# Patient Record
Sex: MALE | Race: WHITE | NOT HISPANIC OR LATINO | Employment: OTHER | URBAN - METROPOLITAN AREA
[De-identification: names, ages, dates, MRNs, and addresses within clinical notes are randomized per-mention and may not be internally consistent; named-entity substitution may affect disease eponyms.]

---

## 2017-03-07 ENCOUNTER — COMMUNICATION - HEALTHEAST (OUTPATIENT)
Dept: CARDIOLOGY | Facility: CLINIC | Age: 62
End: 2017-03-07

## 2017-03-07 DIAGNOSIS — I10 HYPERTENSION: ICD-10-CM

## 2017-04-24 ENCOUNTER — COMMUNICATION - HEALTHEAST (OUTPATIENT)
Dept: CARDIOLOGY | Facility: CLINIC | Age: 62
End: 2017-04-24

## 2017-04-24 DIAGNOSIS — I25.10 CORONARY ATHEROSCLEROSIS: ICD-10-CM

## 2017-05-22 ENCOUNTER — COMMUNICATION - HEALTHEAST (OUTPATIENT)
Dept: FAMILY MEDICINE | Facility: CLINIC | Age: 62
End: 2017-05-22

## 2017-05-22 DIAGNOSIS — E11.9 DIABETES (H): ICD-10-CM

## 2017-05-24 ENCOUNTER — COMMUNICATION - HEALTHEAST (OUTPATIENT)
Dept: FAMILY MEDICINE | Facility: CLINIC | Age: 62
End: 2017-05-24

## 2017-05-24 DIAGNOSIS — E11.9 DIABETES (H): ICD-10-CM

## 2017-05-26 ENCOUNTER — COMMUNICATION - HEALTHEAST (OUTPATIENT)
Dept: FAMILY MEDICINE | Facility: CLINIC | Age: 62
End: 2017-05-26

## 2017-05-26 ENCOUNTER — COMMUNICATION - HEALTHEAST (OUTPATIENT)
Dept: CARDIOLOGY | Facility: CLINIC | Age: 62
End: 2017-05-26

## 2017-05-26 DIAGNOSIS — E11.9 DIABETES (H): ICD-10-CM

## 2017-05-30 ENCOUNTER — COMMUNICATION - HEALTHEAST (OUTPATIENT)
Dept: FAMILY MEDICINE | Facility: CLINIC | Age: 62
End: 2017-05-30

## 2017-05-30 ENCOUNTER — COMMUNICATION - HEALTHEAST (OUTPATIENT)
Dept: SCHEDULING | Facility: CLINIC | Age: 62
End: 2017-05-30

## 2017-05-30 DIAGNOSIS — E11.9 DIABETES (H): ICD-10-CM

## 2017-06-07 ENCOUNTER — AMBULATORY - HEALTHEAST (OUTPATIENT)
Dept: LAB | Facility: CLINIC | Age: 62
End: 2017-06-07

## 2017-06-07 DIAGNOSIS — I25.10 CORONARY ARTERY DISEASE: ICD-10-CM

## 2017-06-07 DIAGNOSIS — E11.9 TYPE II OR UNSPECIFIED TYPE DIABETES MELLITUS WITHOUT MENTION OF COMPLICATION, NOT STATED AS UNCONTROLLED: ICD-10-CM

## 2017-06-07 DIAGNOSIS — I10 ESSENTIAL HYPERTENSION, BENIGN: ICD-10-CM

## 2017-06-07 DIAGNOSIS — E78.00 PURE HYPERCHOLESTEROLEMIA: ICD-10-CM

## 2017-06-07 LAB
CHOLEST SERPL-MCNC: 88 MG/DL
FASTING STATUS PATIENT QL REPORTED: YES
HBA1C MFR BLD: 7.2 % (ref 3.5–6)
HDLC SERPL-MCNC: 32 MG/DL
LDLC SERPL CALC-MCNC: 42 MG/DL
TRIGL SERPL-MCNC: 68 MG/DL

## 2017-06-12 ENCOUNTER — RECORDS - HEALTHEAST (OUTPATIENT)
Dept: ADMINISTRATIVE | Facility: OTHER | Age: 62
End: 2017-06-12

## 2017-06-17 ENCOUNTER — RECORDS - HEALTHEAST (OUTPATIENT)
Dept: ADMINISTRATIVE | Facility: OTHER | Age: 62
End: 2017-06-17

## 2017-06-19 ENCOUNTER — COMMUNICATION - HEALTHEAST (OUTPATIENT)
Dept: CARDIOLOGY | Facility: CLINIC | Age: 62
End: 2017-06-19

## 2017-06-25 ENCOUNTER — COMMUNICATION - HEALTHEAST (OUTPATIENT)
Dept: CARDIOLOGY | Facility: CLINIC | Age: 62
End: 2017-06-25

## 2017-06-25 DIAGNOSIS — I21.3 STEMI (ST ELEVATION MYOCARDIAL INFARCTION) (H): ICD-10-CM

## 2017-06-30 ENCOUNTER — OFFICE VISIT - HEALTHEAST (OUTPATIENT)
Dept: CARDIOLOGY | Facility: CLINIC | Age: 62
End: 2017-06-30

## 2017-06-30 DIAGNOSIS — I25.10 CORONARY ARTERY DISEASE DUE TO LIPID RICH PLAQUE: ICD-10-CM

## 2017-06-30 DIAGNOSIS — I25.83 CORONARY ARTERY DISEASE DUE TO LIPID RICH PLAQUE: ICD-10-CM

## 2017-06-30 ASSESSMENT — MIFFLIN-ST. JEOR: SCORE: 1622.26

## 2017-07-25 ENCOUNTER — OFFICE VISIT - HEALTHEAST (OUTPATIENT)
Dept: FAMILY MEDICINE | Facility: CLINIC | Age: 62
End: 2017-07-25

## 2017-07-25 DIAGNOSIS — E66.9 OBESITY, UNSPECIFIED: ICD-10-CM

## 2017-07-25 DIAGNOSIS — I10 ESSENTIAL HYPERTENSION, BENIGN: ICD-10-CM

## 2017-07-25 DIAGNOSIS — E78.2 MIXED HYPERLIPIDEMIA: ICD-10-CM

## 2017-07-25 DIAGNOSIS — Z00.00 ROUTINE ADULT HEALTH MAINTENANCE: ICD-10-CM

## 2017-07-25 ASSESSMENT — MIFFLIN-ST. JEOR: SCORE: 1618.58

## 2017-09-24 ENCOUNTER — COMMUNICATION - HEALTHEAST (OUTPATIENT)
Dept: CARDIOLOGY | Facility: CLINIC | Age: 62
End: 2017-09-24

## 2017-09-24 DIAGNOSIS — I21.3 STEMI (ST ELEVATION MYOCARDIAL INFARCTION) (H): ICD-10-CM

## 2017-11-15 ENCOUNTER — COMMUNICATION - HEALTHEAST (OUTPATIENT)
Dept: CARDIOLOGY | Facility: CLINIC | Age: 62
End: 2017-11-15

## 2017-12-10 ENCOUNTER — COMMUNICATION - HEALTHEAST (OUTPATIENT)
Dept: CARDIOLOGY | Facility: CLINIC | Age: 62
End: 2017-12-10

## 2017-12-10 DIAGNOSIS — I10 HYPERTENSION: ICD-10-CM

## 2018-01-18 ENCOUNTER — COMMUNICATION - HEALTHEAST (OUTPATIENT)
Dept: CARDIOLOGY | Facility: CLINIC | Age: 63
End: 2018-01-18

## 2018-01-18 DIAGNOSIS — I25.10 CORONARY ATHEROSCLEROSIS: ICD-10-CM

## 2018-02-20 ENCOUNTER — COMMUNICATION - HEALTHEAST (OUTPATIENT)
Dept: SCHEDULING | Facility: CLINIC | Age: 63
End: 2018-02-20

## 2018-02-20 DIAGNOSIS — E11.9 DIABETES (H): ICD-10-CM

## 2018-03-20 ENCOUNTER — COMMUNICATION - HEALTHEAST (OUTPATIENT)
Dept: LAB | Facility: CLINIC | Age: 63
End: 2018-03-20

## 2018-03-20 DIAGNOSIS — I25.10 CORONARY ARTERY DISEASE DUE TO LIPID RICH PLAQUE: ICD-10-CM

## 2018-03-20 DIAGNOSIS — I10 ESSENTIAL HYPERTENSION, BENIGN: ICD-10-CM

## 2018-03-20 DIAGNOSIS — I25.83 CORONARY ARTERY DISEASE DUE TO LIPID RICH PLAQUE: ICD-10-CM

## 2018-03-20 DIAGNOSIS — E78.2 MIXED HYPERLIPIDEMIA: ICD-10-CM

## 2018-03-21 ENCOUNTER — AMBULATORY - HEALTHEAST (OUTPATIENT)
Dept: LAB | Facility: CLINIC | Age: 63
End: 2018-03-21

## 2018-03-21 DIAGNOSIS — I25.83 CORONARY ARTERY DISEASE DUE TO LIPID RICH PLAQUE: ICD-10-CM

## 2018-03-21 DIAGNOSIS — E78.2 MIXED HYPERLIPIDEMIA: ICD-10-CM

## 2018-03-21 DIAGNOSIS — I10 ESSENTIAL HYPERTENSION, BENIGN: ICD-10-CM

## 2018-03-21 DIAGNOSIS — I25.10 CORONARY ARTERY DISEASE DUE TO LIPID RICH PLAQUE: ICD-10-CM

## 2018-03-21 LAB
ALBUMIN SERPL-MCNC: 4.4 G/DL (ref 3.5–5)
ALP SERPL-CCNC: 112 U/L (ref 45–120)
ALT SERPL W P-5'-P-CCNC: 34 U/L (ref 0–45)
ANION GAP SERPL CALCULATED.3IONS-SCNC: 8 MMOL/L (ref 5–18)
AST SERPL W P-5'-P-CCNC: 17 U/L (ref 0–40)
BILIRUB SERPL-MCNC: 1 MG/DL (ref 0–1)
BUN SERPL-MCNC: 18 MG/DL (ref 8–22)
CALCIUM SERPL-MCNC: 9.4 MG/DL (ref 8.5–10.5)
CHLORIDE BLD-SCNC: 106 MMOL/L (ref 98–107)
CHOLEST SERPL-MCNC: 94 MG/DL
CO2 SERPL-SCNC: 27 MMOL/L (ref 22–31)
CREAT SERPL-MCNC: 0.68 MG/DL (ref 0.7–1.3)
FASTING STATUS PATIENT QL REPORTED: YES
GFR SERPL CREATININE-BSD FRML MDRD: >60 ML/MIN/1.73M2
GLUCOSE BLD-MCNC: 97 MG/DL (ref 70–125)
HBA1C MFR BLD: 7.1 % (ref 3.5–6)
HDLC SERPL-MCNC: 33 MG/DL
LDLC SERPL CALC-MCNC: 43 MG/DL
POTASSIUM BLD-SCNC: 4.8 MMOL/L (ref 3.5–5)
PROT SERPL-MCNC: 7 G/DL (ref 6–8)
SODIUM SERPL-SCNC: 141 MMOL/L (ref 136–145)
TRIGL SERPL-MCNC: 89 MG/DL

## 2018-03-22 ENCOUNTER — COMMUNICATION - HEALTHEAST (OUTPATIENT)
Dept: FAMILY MEDICINE | Facility: CLINIC | Age: 63
End: 2018-03-22

## 2018-03-22 ENCOUNTER — AMBULATORY - HEALTHEAST (OUTPATIENT)
Dept: FAMILY MEDICINE | Facility: CLINIC | Age: 63
End: 2018-03-22

## 2018-03-22 DIAGNOSIS — E11.9 DIABETES (H): ICD-10-CM

## 2018-04-19 ENCOUNTER — COMMUNICATION - HEALTHEAST (OUTPATIENT)
Dept: ADMINISTRATIVE | Facility: CLINIC | Age: 63
End: 2018-04-19

## 2018-05-21 ENCOUNTER — TRANSFERRED RECORDS (OUTPATIENT)
Dept: HEALTH INFORMATION MANAGEMENT | Facility: CLINIC | Age: 63
End: 2018-05-21

## 2018-06-08 ENCOUNTER — OFFICE VISIT - HEALTHEAST (OUTPATIENT)
Dept: CARDIOLOGY | Facility: CLINIC | Age: 63
End: 2018-06-08

## 2018-06-08 DIAGNOSIS — I25.10 CORONARY ARTERY DISEASE DUE TO LIPID RICH PLAQUE: ICD-10-CM

## 2018-06-08 DIAGNOSIS — I25.83 CORONARY ARTERY DISEASE DUE TO LIPID RICH PLAQUE: ICD-10-CM

## 2018-06-08 ASSESSMENT — MIFFLIN-ST. JEOR: SCORE: 1608.66

## 2018-06-15 ENCOUNTER — COMMUNICATION - HEALTHEAST (OUTPATIENT)
Dept: FAMILY MEDICINE | Facility: CLINIC | Age: 63
End: 2018-06-15

## 2018-06-15 ENCOUNTER — COMMUNICATION - HEALTHEAST (OUTPATIENT)
Dept: CARDIOLOGY | Facility: CLINIC | Age: 63
End: 2018-06-15

## 2018-06-15 DIAGNOSIS — E11.9 DIABETES (H): ICD-10-CM

## 2018-06-15 DIAGNOSIS — I21.3 STEMI (ST ELEVATION MYOCARDIAL INFARCTION) (H): ICD-10-CM

## 2018-06-15 DIAGNOSIS — I10 HYPERTENSION: ICD-10-CM

## 2018-07-09 ENCOUNTER — COMMUNICATION - HEALTHEAST (OUTPATIENT)
Dept: CARDIOLOGY | Facility: CLINIC | Age: 63
End: 2018-07-09

## 2018-07-09 DIAGNOSIS — I25.10 CORONARY ATHEROSCLEROSIS: ICD-10-CM

## 2018-08-02 ENCOUNTER — OFFICE VISIT - HEALTHEAST (OUTPATIENT)
Dept: FAMILY MEDICINE | Facility: CLINIC | Age: 63
End: 2018-08-02

## 2018-08-02 DIAGNOSIS — E11.9 CONTROLLED TYPE 2 DIABETES MELLITUS WITHOUT COMPLICATION, WITHOUT LONG-TERM CURRENT USE OF INSULIN (H): ICD-10-CM

## 2018-08-02 DIAGNOSIS — I25.10 CORONARY ARTERY DISEASE DUE TO LIPID RICH PLAQUE: ICD-10-CM

## 2018-08-02 DIAGNOSIS — I25.83 CORONARY ARTERY DISEASE DUE TO LIPID RICH PLAQUE: ICD-10-CM

## 2018-08-02 DIAGNOSIS — I10 ESSENTIAL HYPERTENSION, BENIGN: ICD-10-CM

## 2018-08-02 DIAGNOSIS — Z12.5 SCREENING FOR PROSTATE CANCER: ICD-10-CM

## 2018-08-02 DIAGNOSIS — Z00.00 ROUTINE ADULT HEALTH MAINTENANCE: ICD-10-CM

## 2018-08-02 LAB
ALBUMIN SERPL-MCNC: 4.3 G/DL (ref 3.5–5)
ALP SERPL-CCNC: 103 U/L (ref 45–120)
ALT SERPL W P-5'-P-CCNC: 32 U/L (ref 0–45)
ANION GAP SERPL CALCULATED.3IONS-SCNC: 9 MMOL/L (ref 5–18)
AST SERPL W P-5'-P-CCNC: 17 U/L (ref 0–40)
BILIRUB SERPL-MCNC: 0.7 MG/DL (ref 0–1)
BUN SERPL-MCNC: 20 MG/DL (ref 8–22)
CALCIUM SERPL-MCNC: 9.6 MG/DL (ref 8.5–10.5)
CHLORIDE BLD-SCNC: 104 MMOL/L (ref 98–107)
CO2 SERPL-SCNC: 26 MMOL/L (ref 22–31)
CREAT SERPL-MCNC: 0.8 MG/DL (ref 0.7–1.3)
CREAT UR-MCNC: 113.9 MG/DL
GFR SERPL CREATININE-BSD FRML MDRD: >60 ML/MIN/1.73M2
GLUCOSE BLD-MCNC: 141 MG/DL (ref 70–125)
HBA1C MFR BLD: 6.9 % (ref 3.5–6)
MICROALBUMIN UR-MCNC: 0.63 MG/DL (ref 0–1.99)
MICROALBUMIN/CREAT UR: 5.5 MG/G
POTASSIUM BLD-SCNC: 6 MMOL/L (ref 3.5–5)
PROT SERPL-MCNC: 6.8 G/DL (ref 6–8)
PSA SERPL-MCNC: 0.8 NG/ML (ref 0–4.5)
SODIUM SERPL-SCNC: 139 MMOL/L (ref 136–145)

## 2018-08-02 ASSESSMENT — MIFFLIN-ST. JEOR: SCORE: 1618.3

## 2018-08-09 ENCOUNTER — COMMUNICATION - HEALTHEAST (OUTPATIENT)
Dept: FAMILY MEDICINE | Facility: CLINIC | Age: 63
End: 2018-08-09

## 2018-08-17 ENCOUNTER — RECORDS - HEALTHEAST (OUTPATIENT)
Dept: ADMINISTRATIVE | Facility: OTHER | Age: 63
End: 2018-08-17

## 2018-09-04 ENCOUNTER — TRANSFERRED RECORDS (OUTPATIENT)
Dept: HEALTH INFORMATION MANAGEMENT | Facility: CLINIC | Age: 63
End: 2018-09-04

## 2018-09-10 ENCOUNTER — COMMUNICATION - HEALTHEAST (OUTPATIENT)
Dept: FAMILY MEDICINE | Facility: CLINIC | Age: 63
End: 2018-09-10

## 2018-09-10 DIAGNOSIS — E11.9 DIABETES (H): ICD-10-CM

## 2018-10-02 ENCOUNTER — COMMUNICATION - HEALTHEAST (OUTPATIENT)
Dept: CARDIOLOGY | Facility: CLINIC | Age: 63
End: 2018-10-02

## 2018-10-02 DIAGNOSIS — I25.10 CORONARY ATHEROSCLEROSIS: ICD-10-CM

## 2018-12-04 ENCOUNTER — COMMUNICATION - HEALTHEAST (OUTPATIENT)
Dept: FAMILY MEDICINE | Facility: CLINIC | Age: 63
End: 2018-12-04

## 2018-12-04 DIAGNOSIS — E11.9 DIABETES (H): ICD-10-CM

## 2019-03-17 ENCOUNTER — COMMUNICATION - HEALTHEAST (OUTPATIENT)
Dept: CARDIOLOGY | Facility: CLINIC | Age: 64
End: 2019-03-17

## 2019-03-17 DIAGNOSIS — I21.3 STEMI (ST ELEVATION MYOCARDIAL INFARCTION) (H): ICD-10-CM

## 2019-03-17 DIAGNOSIS — I10 HYPERTENSION: ICD-10-CM

## 2019-04-15 ENCOUNTER — COMMUNICATION - HEALTHEAST (OUTPATIENT)
Dept: ADMINISTRATIVE | Facility: CLINIC | Age: 64
End: 2019-04-15

## 2019-06-23 ENCOUNTER — COMMUNICATION - HEALTHEAST (OUTPATIENT)
Dept: FAMILY MEDICINE | Facility: CLINIC | Age: 64
End: 2019-06-23

## 2019-06-23 DIAGNOSIS — I25.10 CORONARY ARTERY DISEASE DUE TO LIPID RICH PLAQUE: ICD-10-CM

## 2019-06-23 DIAGNOSIS — E11.9 DIABETES (H): ICD-10-CM

## 2019-06-23 DIAGNOSIS — I10 ESSENTIAL HYPERTENSION, BENIGN: ICD-10-CM

## 2019-06-23 DIAGNOSIS — I25.83 CORONARY ARTERY DISEASE DUE TO LIPID RICH PLAQUE: ICD-10-CM

## 2019-06-25 ENCOUNTER — COMMUNICATION - HEALTHEAST (OUTPATIENT)
Dept: FAMILY MEDICINE | Facility: CLINIC | Age: 64
End: 2019-06-25

## 2019-06-25 ENCOUNTER — AMBULATORY - HEALTHEAST (OUTPATIENT)
Dept: LAB | Facility: CLINIC | Age: 64
End: 2019-06-25

## 2019-06-25 DIAGNOSIS — I25.10 CORONARY ARTERY DISEASE DUE TO LIPID RICH PLAQUE: ICD-10-CM

## 2019-06-25 DIAGNOSIS — E11.9 DIABETES (H): ICD-10-CM

## 2019-06-25 DIAGNOSIS — I10 ESSENTIAL HYPERTENSION, BENIGN: ICD-10-CM

## 2019-06-25 DIAGNOSIS — I25.83 CORONARY ARTERY DISEASE DUE TO LIPID RICH PLAQUE: ICD-10-CM

## 2019-06-25 LAB
ALBUMIN SERPL-MCNC: 4.5 G/DL (ref 3.5–5)
ALP SERPL-CCNC: 127 U/L (ref 45–120)
ALT SERPL W P-5'-P-CCNC: 34 U/L (ref 0–45)
ANION GAP SERPL CALCULATED.3IONS-SCNC: 10 MMOL/L (ref 5–18)
AST SERPL W P-5'-P-CCNC: 18 U/L (ref 0–40)
BILIRUB SERPL-MCNC: 0.6 MG/DL (ref 0–1)
BUN SERPL-MCNC: 23 MG/DL (ref 8–22)
CALCIUM SERPL-MCNC: 9.7 MG/DL (ref 8.5–10.5)
CHLORIDE BLD-SCNC: 106 MMOL/L (ref 98–107)
CHOLEST SERPL-MCNC: 98 MG/DL
CO2 SERPL-SCNC: 22 MMOL/L (ref 22–31)
CREAT SERPL-MCNC: 0.82 MG/DL (ref 0.7–1.3)
FASTING STATUS PATIENT QL REPORTED: YES
GFR SERPL CREATININE-BSD FRML MDRD: >60 ML/MIN/1.73M2
GLUCOSE BLD-MCNC: 148 MG/DL (ref 70–125)
HBA1C MFR BLD: 7.4 % (ref 3.5–6)
HDLC SERPL-MCNC: 35 MG/DL
LDLC SERPL CALC-MCNC: 43 MG/DL
POTASSIUM BLD-SCNC: 5.1 MMOL/L (ref 3.5–5)
PROT SERPL-MCNC: 6.7 G/DL (ref 6–8)
SODIUM SERPL-SCNC: 138 MMOL/L (ref 136–145)
TRIGL SERPL-MCNC: 98 MG/DL

## 2019-06-28 ENCOUNTER — COMMUNICATION - HEALTHEAST (OUTPATIENT)
Dept: FAMILY MEDICINE | Facility: CLINIC | Age: 64
End: 2019-06-28

## 2019-07-09 ENCOUNTER — COMMUNICATION - HEALTHEAST (OUTPATIENT)
Dept: CARDIOLOGY | Facility: CLINIC | Age: 64
End: 2019-07-09

## 2019-07-09 DIAGNOSIS — I25.10 CORONARY ATHEROSCLEROSIS: ICD-10-CM

## 2019-08-14 ENCOUNTER — OFFICE VISIT - HEALTHEAST (OUTPATIENT)
Dept: CARDIOLOGY | Facility: CLINIC | Age: 64
End: 2019-08-14

## 2019-08-14 DIAGNOSIS — I25.83 CORONARY ARTERY DISEASE DUE TO LIPID RICH PLAQUE: ICD-10-CM

## 2019-08-14 DIAGNOSIS — I10 ESSENTIAL HYPERTENSION, BENIGN: ICD-10-CM

## 2019-08-14 DIAGNOSIS — I25.10 CORONARY ARTERY DISEASE DUE TO LIPID RICH PLAQUE: ICD-10-CM

## 2019-08-14 ASSESSMENT — MIFFLIN-ST. JEOR: SCORE: 1631.34

## 2019-09-16 ENCOUNTER — OFFICE VISIT - HEALTHEAST (OUTPATIENT)
Dept: FAMILY MEDICINE | Facility: CLINIC | Age: 64
End: 2019-09-16

## 2019-09-16 DIAGNOSIS — Z00.00 ROUTINE ADULT HEALTH MAINTENANCE: ICD-10-CM

## 2019-09-16 DIAGNOSIS — E78.2 MIXED HYPERLIPIDEMIA: ICD-10-CM

## 2019-09-16 DIAGNOSIS — Z12.5 SCREENING FOR PROSTATE CANCER: ICD-10-CM

## 2019-09-16 DIAGNOSIS — E11.9 CONTROLLED TYPE 2 DIABETES MELLITUS WITHOUT COMPLICATION, WITHOUT LONG-TERM CURRENT USE OF INSULIN (H): ICD-10-CM

## 2019-09-16 DIAGNOSIS — Z11.59 NEED FOR HEPATITIS C SCREENING TEST: ICD-10-CM

## 2019-09-16 DIAGNOSIS — I10 ESSENTIAL HYPERTENSION, BENIGN: ICD-10-CM

## 2019-09-16 DIAGNOSIS — I25.83 CORONARY ARTERY DISEASE DUE TO LIPID RICH PLAQUE: ICD-10-CM

## 2019-09-16 DIAGNOSIS — I25.10 CORONARY ARTERY DISEASE DUE TO LIPID RICH PLAQUE: ICD-10-CM

## 2019-09-16 ASSESSMENT — MIFFLIN-ST. JEOR: SCORE: 1647.21

## 2019-10-03 ENCOUNTER — COMMUNICATION - HEALTHEAST (OUTPATIENT)
Dept: CARDIOLOGY | Facility: CLINIC | Age: 64
End: 2019-10-03

## 2019-10-03 DIAGNOSIS — I21.3 STEMI (ST ELEVATION MYOCARDIAL INFARCTION) (H): ICD-10-CM

## 2019-10-17 ENCOUNTER — COMMUNICATION - HEALTHEAST (OUTPATIENT)
Dept: CARDIOLOGY | Facility: CLINIC | Age: 64
End: 2019-10-17

## 2019-10-17 DIAGNOSIS — I10 HYPERTENSION: ICD-10-CM

## 2019-10-17 DIAGNOSIS — I25.10 CORONARY ATHEROSCLEROSIS: ICD-10-CM

## 2019-12-18 ENCOUNTER — COMMUNICATION - HEALTHEAST (OUTPATIENT)
Dept: CARDIOLOGY | Facility: CLINIC | Age: 64
End: 2019-12-18

## 2019-12-19 ENCOUNTER — COMMUNICATION - HEALTHEAST (OUTPATIENT)
Dept: FAMILY MEDICINE | Facility: CLINIC | Age: 64
End: 2019-12-19

## 2019-12-19 ENCOUNTER — AMBULATORY - HEALTHEAST (OUTPATIENT)
Dept: LAB | Facility: CLINIC | Age: 64
End: 2019-12-19

## 2019-12-19 DIAGNOSIS — I10 ESSENTIAL HYPERTENSION, BENIGN: ICD-10-CM

## 2019-12-19 DIAGNOSIS — E11.9 CONTROLLED TYPE 2 DIABETES MELLITUS WITHOUT COMPLICATION, WITHOUT LONG-TERM CURRENT USE OF INSULIN (H): ICD-10-CM

## 2019-12-19 DIAGNOSIS — Z12.5 SCREENING FOR PROSTATE CANCER: ICD-10-CM

## 2019-12-19 DIAGNOSIS — Z11.59 NEED FOR HEPATITIS C SCREENING TEST: ICD-10-CM

## 2019-12-19 DIAGNOSIS — E78.2 MIXED HYPERLIPIDEMIA: ICD-10-CM

## 2019-12-19 LAB
ALBUMIN SERPL-MCNC: 4.4 G/DL (ref 3.5–5)
ALP SERPL-CCNC: 119 U/L (ref 45–120)
ALT SERPL W P-5'-P-CCNC: 38 U/L (ref 0–45)
ANION GAP SERPL CALCULATED.3IONS-SCNC: 12 MMOL/L (ref 5–18)
AST SERPL W P-5'-P-CCNC: 18 U/L (ref 0–40)
BILIRUB SERPL-MCNC: 0.6 MG/DL (ref 0–1)
BUN SERPL-MCNC: 18 MG/DL (ref 8–22)
CALCIUM SERPL-MCNC: 9.5 MG/DL (ref 8.5–10.5)
CHLORIDE BLD-SCNC: 104 MMOL/L (ref 98–107)
CHOLEST SERPL-MCNC: 114 MG/DL
CO2 SERPL-SCNC: 24 MMOL/L (ref 22–31)
CREAT SERPL-MCNC: 0.78 MG/DL (ref 0.7–1.3)
CREAT UR-MCNC: 104.9 MG/DL
FASTING STATUS PATIENT QL REPORTED: YES
GFR SERPL CREATININE-BSD FRML MDRD: >60 ML/MIN/1.73M2
GLUCOSE BLD-MCNC: 166 MG/DL (ref 70–125)
HBA1C MFR BLD: 8.1 % (ref 3.5–6)
HDLC SERPL-MCNC: 46 MG/DL
LDLC SERPL CALC-MCNC: 48 MG/DL
MICROALBUMIN UR-MCNC: 13.19 MG/DL (ref 0–1.99)
MICROALBUMIN/CREAT UR: 125.7 MG/G
POTASSIUM BLD-SCNC: 4.9 MMOL/L (ref 3.5–5)
PROT SERPL-MCNC: 6.7 G/DL (ref 6–8)
PSA SERPL-MCNC: 0.6 NG/ML (ref 0–4.5)
SODIUM SERPL-SCNC: 140 MMOL/L (ref 136–145)
TRIGL SERPL-MCNC: 99 MG/DL

## 2019-12-20 ENCOUNTER — COMMUNICATION - HEALTHEAST (OUTPATIENT)
Dept: FAMILY MEDICINE | Facility: CLINIC | Age: 64
End: 2019-12-20

## 2019-12-20 DIAGNOSIS — E11.9 DIABETES (H): ICD-10-CM

## 2019-12-20 LAB — HCV AB SERPL QL IA: NEGATIVE

## 2019-12-23 ENCOUNTER — COMMUNICATION - HEALTHEAST (OUTPATIENT)
Dept: FAMILY MEDICINE | Facility: CLINIC | Age: 64
End: 2019-12-23

## 2020-01-08 ENCOUNTER — COMMUNICATION - HEALTHEAST (OUTPATIENT)
Dept: CARDIOLOGY | Facility: CLINIC | Age: 65
End: 2020-01-08

## 2020-01-08 DIAGNOSIS — I10 HYPERTENSION: ICD-10-CM

## 2020-01-08 DIAGNOSIS — I25.10 CORONARY ATHEROSCLEROSIS: ICD-10-CM

## 2020-06-22 ENCOUNTER — COMMUNICATION - HEALTHEAST (OUTPATIENT)
Dept: CARDIOLOGY | Facility: CLINIC | Age: 65
End: 2020-06-22

## 2020-06-22 DIAGNOSIS — I21.3 STEMI (ST ELEVATION MYOCARDIAL INFARCTION) (H): ICD-10-CM

## 2020-07-09 ENCOUNTER — COMMUNICATION - HEALTHEAST (OUTPATIENT)
Dept: CARDIOLOGY | Facility: CLINIC | Age: 65
End: 2020-07-09

## 2020-07-09 DIAGNOSIS — I10 HYPERTENSION: ICD-10-CM

## 2020-07-09 DIAGNOSIS — I25.10 CORONARY ATHEROSCLEROSIS: ICD-10-CM

## 2020-07-31 ENCOUNTER — RECORDS - HEALTHEAST (OUTPATIENT)
Dept: ADMINISTRATIVE | Facility: OTHER | Age: 65
End: 2020-07-31

## 2020-07-31 ENCOUNTER — TRANSFERRED RECORDS (OUTPATIENT)
Dept: HEALTH INFORMATION MANAGEMENT | Facility: CLINIC | Age: 65
End: 2020-07-31

## 2020-08-24 ENCOUNTER — COMMUNICATION - HEALTHEAST (OUTPATIENT)
Dept: CARDIOLOGY | Facility: CLINIC | Age: 65
End: 2020-08-24

## 2020-08-27 ENCOUNTER — RECORDS - HEALTHEAST (OUTPATIENT)
Dept: ADMINISTRATIVE | Facility: OTHER | Age: 65
End: 2020-08-27

## 2020-08-27 LAB — RETINOPATHY: NEGATIVE

## 2020-09-01 ENCOUNTER — RECORDS - HEALTHEAST (OUTPATIENT)
Dept: HEALTH INFORMATION MANAGEMENT | Facility: CLINIC | Age: 65
End: 2020-09-01

## 2020-09-18 ENCOUNTER — OFFICE VISIT - HEALTHEAST (OUTPATIENT)
Dept: FAMILY MEDICINE | Facility: CLINIC | Age: 65
End: 2020-09-18

## 2020-09-18 ENCOUNTER — COMMUNICATION - HEALTHEAST (OUTPATIENT)
Dept: FAMILY MEDICINE | Facility: CLINIC | Age: 65
End: 2020-09-18

## 2020-09-18 DIAGNOSIS — I10 ESSENTIAL HYPERTENSION, BENIGN: ICD-10-CM

## 2020-09-18 DIAGNOSIS — E11.9 DIABETES (H): ICD-10-CM

## 2020-09-18 DIAGNOSIS — E11.9 CONTROLLED TYPE 2 DIABETES MELLITUS WITHOUT COMPLICATION, WITHOUT LONG-TERM CURRENT USE OF INSULIN (H): ICD-10-CM

## 2020-09-18 DIAGNOSIS — I25.10 CORONARY ARTERY DISEASE DUE TO LIPID RICH PLAQUE: ICD-10-CM

## 2020-09-18 DIAGNOSIS — I25.83 CORONARY ARTERY DISEASE DUE TO LIPID RICH PLAQUE: ICD-10-CM

## 2020-09-18 DIAGNOSIS — E78.2 MIXED HYPERLIPIDEMIA: ICD-10-CM

## 2020-09-18 DIAGNOSIS — Z00.00 ROUTINE ADULT HEALTH MAINTENANCE: ICD-10-CM

## 2020-09-18 LAB
ALBUMIN SERPL-MCNC: 4.5 G/DL (ref 3.5–5)
ALP SERPL-CCNC: 114 U/L (ref 45–120)
ALT SERPL W P-5'-P-CCNC: 31 U/L (ref 0–45)
ANION GAP SERPL CALCULATED.3IONS-SCNC: 9 MMOL/L (ref 5–18)
AST SERPL W P-5'-P-CCNC: 14 U/L (ref 0–40)
BILIRUB SERPL-MCNC: 0.6 MG/DL (ref 0–1)
BUN SERPL-MCNC: 15 MG/DL (ref 8–22)
CALCIUM SERPL-MCNC: 9.6 MG/DL (ref 8.5–10.5)
CHLORIDE BLD-SCNC: 105 MMOL/L (ref 98–107)
CHOLEST SERPL-MCNC: 103 MG/DL
CO2 SERPL-SCNC: 25 MMOL/L (ref 22–31)
CREAT SERPL-MCNC: 0.81 MG/DL (ref 0.7–1.3)
FASTING STATUS PATIENT QL REPORTED: YES
GFR SERPL CREATININE-BSD FRML MDRD: >60 ML/MIN/1.73M2
GLUCOSE BLD-MCNC: 157 MG/DL (ref 70–125)
HBA1C MFR BLD: 7.2 %
HDLC SERPL-MCNC: 39 MG/DL
LDLC SERPL CALC-MCNC: 36 MG/DL
POTASSIUM BLD-SCNC: 5.7 MMOL/L (ref 3.5–5)
PROT SERPL-MCNC: 6.8 G/DL (ref 6–8)
SODIUM SERPL-SCNC: 139 MMOL/L (ref 136–145)
TRIGL SERPL-MCNC: 139 MG/DL

## 2020-09-18 ASSESSMENT — MIFFLIN-ST. JEOR: SCORE: 1631.34

## 2020-09-20 RX ORDER — GLIPIZIDE 10 MG/1
TABLET ORAL
Qty: 180 TABLET | Refills: 3 | Status: SHIPPED | OUTPATIENT
Start: 2020-09-20 | End: 2021-09-25

## 2020-09-23 ENCOUNTER — COMMUNICATION - HEALTHEAST (OUTPATIENT)
Dept: CARDIOLOGY | Facility: CLINIC | Age: 65
End: 2020-09-23

## 2020-09-24 ENCOUNTER — COMMUNICATION - HEALTHEAST (OUTPATIENT)
Dept: FAMILY MEDICINE | Facility: CLINIC | Age: 65
End: 2020-09-24

## 2020-09-30 ENCOUNTER — COMMUNICATION - HEALTHEAST (OUTPATIENT)
Dept: CARDIOLOGY | Facility: CLINIC | Age: 65
End: 2020-09-30

## 2020-09-30 DIAGNOSIS — I25.10 CORONARY ARTERY DISEASE DUE TO LIPID RICH PLAQUE: ICD-10-CM

## 2020-09-30 DIAGNOSIS — I25.83 CORONARY ARTERY DISEASE DUE TO LIPID RICH PLAQUE: ICD-10-CM

## 2020-10-02 ENCOUNTER — AMBULATORY - HEALTHEAST (OUTPATIENT)
Dept: LAB | Facility: CLINIC | Age: 65
End: 2020-10-02

## 2020-10-02 DIAGNOSIS — I25.83 CORONARY ARTERY DISEASE DUE TO LIPID RICH PLAQUE: ICD-10-CM

## 2020-10-02 DIAGNOSIS — I25.10 CORONARY ARTERY DISEASE DUE TO LIPID RICH PLAQUE: ICD-10-CM

## 2020-10-02 LAB
ALT SERPL W P-5'-P-CCNC: 35 U/L (ref 0–45)
AST SERPL W P-5'-P-CCNC: 14 U/L (ref 0–40)

## 2020-10-07 ENCOUNTER — COMMUNICATION - HEALTHEAST (OUTPATIENT)
Dept: FAMILY MEDICINE | Facility: CLINIC | Age: 65
End: 2020-10-07

## 2020-10-08 ENCOUNTER — AMBULATORY - HEALTHEAST (OUTPATIENT)
Dept: FAMILY MEDICINE | Facility: CLINIC | Age: 65
End: 2020-10-08

## 2020-10-08 DIAGNOSIS — I10 ESSENTIAL HYPERTENSION, BENIGN: ICD-10-CM

## 2020-10-08 DIAGNOSIS — M54.42 ACUTE BILATERAL LOW BACK PAIN WITH BILATERAL SCIATICA: ICD-10-CM

## 2020-10-08 DIAGNOSIS — M54.41 ACUTE BILATERAL LOW BACK PAIN WITH BILATERAL SCIATICA: ICD-10-CM

## 2020-10-19 ENCOUNTER — COMMUNICATION - HEALTHEAST (OUTPATIENT)
Dept: FAMILY MEDICINE | Facility: CLINIC | Age: 65
End: 2020-10-19

## 2020-10-23 ENCOUNTER — OFFICE VISIT - HEALTHEAST (OUTPATIENT)
Dept: FAMILY MEDICINE | Facility: CLINIC | Age: 65
End: 2020-10-23

## 2020-10-23 DIAGNOSIS — R29.898 WEAKNESS OF LEFT LOWER EXTREMITY: ICD-10-CM

## 2020-10-23 DIAGNOSIS — R20.2 PARESTHESIAS: ICD-10-CM

## 2020-10-23 DIAGNOSIS — M47.16 LUMBAR SPONDYLOSIS WITH MYELOPATHY: ICD-10-CM

## 2020-10-23 DIAGNOSIS — M54.50 ACUTE MIDLINE LOW BACK PAIN WITHOUT SCIATICA: ICD-10-CM

## 2020-10-27 ENCOUNTER — HOSPITAL ENCOUNTER (OUTPATIENT)
Dept: MRI IMAGING | Facility: CLINIC | Age: 65
Discharge: HOME | End: 2020-10-27
Attending: FAMILY MEDICINE

## 2020-10-27 DIAGNOSIS — R20.2 PARESTHESIAS: ICD-10-CM

## 2020-10-27 DIAGNOSIS — R29.898 WEAKNESS OF LEFT LOWER EXTREMITY: ICD-10-CM

## 2020-10-27 DIAGNOSIS — M54.50 ACUTE MIDLINE LOW BACK PAIN WITHOUT SCIATICA: ICD-10-CM

## 2020-11-02 ENCOUNTER — AMBULATORY - HEALTHEAST (OUTPATIENT)
Dept: NEUROSURGERY | Facility: CLINIC | Age: 65
End: 2020-11-02

## 2020-11-02 ENCOUNTER — COMMUNICATION - HEALTHEAST (OUTPATIENT)
Dept: NEUROSURGERY | Facility: CLINIC | Age: 65
End: 2020-11-02

## 2020-11-02 DIAGNOSIS — M54.9 BACK PAIN: ICD-10-CM

## 2020-11-03 ENCOUNTER — OFFICE VISIT - HEALTHEAST (OUTPATIENT)
Dept: NEUROSURGERY | Facility: CLINIC | Age: 65
End: 2020-11-03

## 2020-11-03 ENCOUNTER — RECORDS - HEALTHEAST (OUTPATIENT)
Dept: GENERAL RADIOLOGY | Facility: CLINIC | Age: 65
End: 2020-11-03

## 2020-11-03 DIAGNOSIS — R29.898 WEAKNESS OF BOTH LOWER EXTREMITIES: ICD-10-CM

## 2020-11-03 DIAGNOSIS — M54.9 DORSALGIA, UNSPECIFIED: ICD-10-CM

## 2020-11-03 DIAGNOSIS — R26.89 IMBALANCE: ICD-10-CM

## 2020-11-03 ASSESSMENT — MIFFLIN-ST. JEOR: SCORE: 1631.34

## 2020-11-10 ENCOUNTER — RECORDS - HEALTHEAST (OUTPATIENT)
Dept: RADIOLOGY | Facility: CLINIC | Age: 65
End: 2020-11-10

## 2020-11-10 ENCOUNTER — COMMUNICATION - HEALTHEAST (OUTPATIENT)
Dept: NEUROSURGERY | Facility: CLINIC | Age: 65
End: 2020-11-10

## 2020-11-11 ENCOUNTER — OFFICE VISIT - HEALTHEAST (OUTPATIENT)
Dept: NEUROSURGERY | Facility: CLINIC | Age: 65
End: 2020-11-11

## 2020-11-11 ENCOUNTER — HOSPITAL ENCOUNTER (OUTPATIENT)
Dept: RADIOLOGY | Facility: HOSPITAL | Age: 65
Discharge: HOME | End: 2020-11-11
Attending: SURGERY

## 2020-11-11 DIAGNOSIS — M47.12 CERVICAL SPONDYLOSIS WITH MYELOPATHY: ICD-10-CM

## 2020-11-11 DIAGNOSIS — R26.89 IMBALANCE: ICD-10-CM

## 2020-11-11 ASSESSMENT — MIFFLIN-ST. JEOR: SCORE: 1615.46

## 2020-11-12 ENCOUNTER — RECORDS - HEALTHEAST (OUTPATIENT)
Dept: ADMINISTRATIVE | Facility: OTHER | Age: 65
End: 2020-11-12

## 2020-11-12 ENCOUNTER — ANESTHESIA - HEALTHEAST (OUTPATIENT)
Dept: SURGERY | Facility: CLINIC | Age: 65
End: 2020-11-12

## 2020-11-12 ENCOUNTER — SURGERY - HEALTHEAST (OUTPATIENT)
Dept: SURGERY | Facility: CLINIC | Age: 65
End: 2020-11-12

## 2020-11-12 ENCOUNTER — COMMUNICATION - HEALTHEAST (OUTPATIENT)
Dept: SCHEDULING | Facility: CLINIC | Age: 65
End: 2020-11-12

## 2020-11-12 ENCOUNTER — AMBULATORY - HEALTHEAST (OUTPATIENT)
Dept: OTHER | Facility: CLINIC | Age: 65
End: 2020-11-12

## 2020-11-14 ENCOUNTER — HOME CARE/HOSPICE - HEALTHEAST (OUTPATIENT)
Dept: HOME HEALTH SERVICES | Facility: HOME HEALTH | Age: 65
End: 2020-11-14

## 2020-11-16 ASSESSMENT — MIFFLIN-ST. JEOR: SCORE: 1585.98

## 2020-11-17 ENCOUNTER — COMMUNICATION - HEALTHEAST (OUTPATIENT)
Dept: HOME HEALTH SERVICES | Facility: HOME HEALTH | Age: 65
End: 2020-11-17

## 2020-11-17 ENCOUNTER — OFFICE VISIT - HEALTHEAST (OUTPATIENT)
Dept: PHARMACY | Facility: CLINIC | Age: 65
End: 2020-11-17

## 2020-11-17 DIAGNOSIS — I25.83 CORONARY ARTERY DISEASE DUE TO LIPID RICH PLAQUE: ICD-10-CM

## 2020-11-17 DIAGNOSIS — E11.9 CONTROLLED TYPE 2 DIABETES MELLITUS WITHOUT COMPLICATION, WITHOUT LONG-TERM CURRENT USE OF INSULIN (H): ICD-10-CM

## 2020-11-17 DIAGNOSIS — M48.03 SPINAL STENOSIS OF CERVICOTHORACIC REGION: ICD-10-CM

## 2020-11-17 DIAGNOSIS — E78.2 MIXED HYPERLIPIDEMIA: ICD-10-CM

## 2020-11-17 DIAGNOSIS — I25.10 CORONARY ARTERY DISEASE DUE TO LIPID RICH PLAQUE: ICD-10-CM

## 2020-11-17 PROCEDURE — 99605 MTMS BY PHARM NP 15 MIN: CPT | Performed by: PHARMACIST

## 2020-11-18 ENCOUNTER — HOME CARE/HOSPICE - HEALTHEAST (OUTPATIENT)
Dept: HOME HEALTH SERVICES | Facility: HOME HEALTH | Age: 65
End: 2020-11-18

## 2020-11-18 ENCOUNTER — COMMUNICATION - HEALTHEAST (OUTPATIENT)
Dept: HOME HEALTH SERVICES | Facility: HOME HEALTH | Age: 65
End: 2020-11-18

## 2020-11-20 ENCOUNTER — HOME CARE/HOSPICE - HEALTHEAST (OUTPATIENT)
Dept: HOME HEALTH SERVICES | Facility: HOME HEALTH | Age: 65
End: 2020-11-20

## 2020-11-23 ENCOUNTER — AMBULATORY - HEALTHEAST (OUTPATIENT)
Dept: NEUROSURGERY | Facility: CLINIC | Age: 65
End: 2020-11-23

## 2020-11-23 ENCOUNTER — COMMUNICATION - HEALTHEAST (OUTPATIENT)
Dept: PHYSICAL MEDICINE AND REHAB | Facility: CLINIC | Age: 65
End: 2020-11-23

## 2020-11-23 ENCOUNTER — HOME CARE/HOSPICE - HEALTHEAST (OUTPATIENT)
Dept: HOME HEALTH SERVICES | Facility: HOME HEALTH | Age: 65
End: 2020-11-23

## 2020-11-23 DIAGNOSIS — G95.20 CERVICAL CORD COMPRESSION WITH MYELOPATHY (H): ICD-10-CM

## 2020-11-23 DIAGNOSIS — M48.03 SPINAL STENOSIS OF CERVICOTHORACIC REGION: ICD-10-CM

## 2020-11-23 DIAGNOSIS — M54.2 NECK PAIN: ICD-10-CM

## 2020-11-24 ENCOUNTER — HOME CARE/HOSPICE - HEALTHEAST (OUTPATIENT)
Dept: HOME HEALTH SERVICES | Facility: HOME HEALTH | Age: 65
End: 2020-11-24

## 2020-11-25 ENCOUNTER — HOME CARE/HOSPICE - HEALTHEAST (OUTPATIENT)
Dept: HOME HEALTH SERVICES | Facility: HOME HEALTH | Age: 65
End: 2020-11-25

## 2020-12-28 ENCOUNTER — HOSPITAL ENCOUNTER (OUTPATIENT)
Dept: RADIOLOGY | Facility: CLINIC | Age: 65
Discharge: HOME | End: 2020-12-28
Attending: SURGERY

## 2020-12-28 ENCOUNTER — COMMUNICATION - HEALTHEAST (OUTPATIENT)
Dept: NEUROSURGERY | Facility: CLINIC | Age: 65
End: 2020-12-28

## 2020-12-28 DIAGNOSIS — M54.2 NECK PAIN: ICD-10-CM

## 2020-12-29 ENCOUNTER — COMMUNICATION - HEALTHEAST (OUTPATIENT)
Dept: CARDIOLOGY | Facility: CLINIC | Age: 65
End: 2020-12-29

## 2020-12-29 DIAGNOSIS — I25.83 CORONARY ARTERY DISEASE DUE TO LIPID RICH PLAQUE: ICD-10-CM

## 2020-12-29 DIAGNOSIS — I25.10 CORONARY ARTERY DISEASE DUE TO LIPID RICH PLAQUE: ICD-10-CM

## 2020-12-30 ENCOUNTER — COMMUNICATION - HEALTHEAST (OUTPATIENT)
Dept: NEUROLOGY | Facility: CLINIC | Age: 65
End: 2020-12-30

## 2020-12-30 ENCOUNTER — COMMUNICATION - HEALTHEAST (OUTPATIENT)
Dept: NEUROSURGERY | Facility: CLINIC | Age: 65
End: 2020-12-30

## 2020-12-30 DIAGNOSIS — M54.2 NECK PAIN: ICD-10-CM

## 2020-12-30 RX ORDER — IBUPROFEN 600 MG/1
600 TABLET, FILM COATED ORAL EVERY 6 HOURS PRN
Qty: 60 TABLET | Refills: 0 | Status: SHIPPED | OUTPATIENT
Start: 2020-12-30 | End: 2023-06-30

## 2020-12-30 RX ORDER — METHOCARBAMOL 750 MG/1
750 TABLET, FILM COATED ORAL 3 TIMES DAILY PRN
Qty: 45 TABLET | Refills: 0 | Status: SHIPPED | OUTPATIENT
Start: 2020-12-30

## 2021-01-05 ENCOUNTER — COMMUNICATION - HEALTHEAST (OUTPATIENT)
Dept: CARDIOLOGY | Facility: CLINIC | Age: 66
End: 2021-01-05

## 2021-01-05 DIAGNOSIS — I25.10 CORONARY ATHEROSCLEROSIS: ICD-10-CM

## 2021-01-05 RX ORDER — LISINOPRIL 20 MG/1
TABLET ORAL
Qty: 180 TABLET | Refills: 1 | Status: SHIPPED | OUTPATIENT
Start: 2021-01-05 | End: 2021-07-13

## 2021-01-12 ENCOUNTER — COMMUNICATION - HEALTHEAST (OUTPATIENT)
Dept: FAMILY MEDICINE | Facility: CLINIC | Age: 66
End: 2021-01-12

## 2021-01-12 ENCOUNTER — OFFICE VISIT - HEALTHEAST (OUTPATIENT)
Dept: NEUROSURGERY | Facility: CLINIC | Age: 66
End: 2021-01-12

## 2021-01-12 DIAGNOSIS — G99.2 MYELOPATHY CONCURRENT WITH AND DUE TO SPINAL STENOSIS OF THORACIC REGION (H): ICD-10-CM

## 2021-01-12 DIAGNOSIS — M48.04 MYELOPATHY CONCURRENT WITH AND DUE TO SPINAL STENOSIS OF THORACIC REGION (H): ICD-10-CM

## 2021-01-12 DIAGNOSIS — I10 ESSENTIAL HYPERTENSION, BENIGN: ICD-10-CM

## 2021-01-12 DIAGNOSIS — G95.20 CERVICAL CORD COMPRESSION WITH MYELOPATHY (H): ICD-10-CM

## 2021-01-12 ASSESSMENT — MIFFLIN-ST. JEOR: SCORE: 1585.98

## 2021-01-19 ENCOUNTER — COMMUNICATION - HEALTHEAST (OUTPATIENT)
Dept: CARDIOLOGY | Facility: CLINIC | Age: 66
End: 2021-01-19

## 2021-01-19 DIAGNOSIS — I10 ESSENTIAL HYPERTENSION, BENIGN: ICD-10-CM

## 2021-02-15 ENCOUNTER — RECORDS - HEALTHEAST (OUTPATIENT)
Dept: RADIOLOGY | Facility: CLINIC | Age: 66
End: 2021-02-15

## 2021-03-01 ENCOUNTER — OFFICE VISIT - HEALTHEAST (OUTPATIENT)
Dept: CARDIOLOGY | Facility: CLINIC | Age: 66
End: 2021-03-01

## 2021-03-01 DIAGNOSIS — I25.83 CORONARY ARTERY DISEASE DUE TO LIPID RICH PLAQUE: ICD-10-CM

## 2021-03-01 DIAGNOSIS — I35.1 NONRHEUMATIC AORTIC VALVE INSUFFICIENCY: ICD-10-CM

## 2021-03-01 DIAGNOSIS — I25.10 CORONARY ARTERY DISEASE DUE TO LIPID RICH PLAQUE: ICD-10-CM

## 2021-03-01 ASSESSMENT — MIFFLIN-ST. JEOR: SCORE: 1617.73

## 2021-03-05 ENCOUNTER — RECORDS - HEALTHEAST (OUTPATIENT)
Dept: ADMINISTRATIVE | Facility: OTHER | Age: 66
End: 2021-03-05

## 2021-03-10 ENCOUNTER — COMMUNICATION - HEALTHEAST (OUTPATIENT)
Dept: FAMILY MEDICINE | Facility: CLINIC | Age: 66
End: 2021-03-10

## 2021-03-25 ENCOUNTER — RECORDS - HEALTHEAST (OUTPATIENT)
Dept: ADMINISTRATIVE | Facility: OTHER | Age: 66
End: 2021-03-25

## 2021-03-25 LAB — RETINOPATHY: NEGATIVE

## 2021-03-26 ENCOUNTER — COMMUNICATION - HEALTHEAST (OUTPATIENT)
Dept: FAMILY MEDICINE | Facility: CLINIC | Age: 66
End: 2021-03-26

## 2021-03-26 ENCOUNTER — OFFICE VISIT - HEALTHEAST (OUTPATIENT)
Dept: FAMILY MEDICINE | Facility: CLINIC | Age: 66
End: 2021-03-26

## 2021-03-26 ENCOUNTER — AMBULATORY - HEALTHEAST (OUTPATIENT)
Dept: NURSING | Facility: CLINIC | Age: 66
End: 2021-03-26

## 2021-03-26 DIAGNOSIS — I25.10 CORONARY ARTERY DISEASE DUE TO LIPID RICH PLAQUE: ICD-10-CM

## 2021-03-26 DIAGNOSIS — Z98.890 HISTORY OF LAMINECTOMY: ICD-10-CM

## 2021-03-26 DIAGNOSIS — E11.9 CONTROLLED TYPE 2 DIABETES MELLITUS WITHOUT COMPLICATION, WITHOUT LONG-TERM CURRENT USE OF INSULIN (H): ICD-10-CM

## 2021-03-26 DIAGNOSIS — I10 ESSENTIAL HYPERTENSION, BENIGN: ICD-10-CM

## 2021-03-26 DIAGNOSIS — E78.2 MIXED HYPERLIPIDEMIA: ICD-10-CM

## 2021-03-26 DIAGNOSIS — I25.83 CORONARY ARTERY DISEASE DUE TO LIPID RICH PLAQUE: ICD-10-CM

## 2021-03-26 LAB
ALBUMIN SERPL-MCNC: 4.5 G/DL (ref 3.5–5)
ALP SERPL-CCNC: 130 U/L (ref 45–120)
ALT SERPL W P-5'-P-CCNC: 22 U/L (ref 0–45)
ANION GAP SERPL CALCULATED.3IONS-SCNC: 10 MMOL/L (ref 5–18)
AST SERPL W P-5'-P-CCNC: 13 U/L (ref 0–40)
BILIRUB SERPL-MCNC: 0.6 MG/DL (ref 0–1)
BUN SERPL-MCNC: 29 MG/DL (ref 8–22)
CALCIUM SERPL-MCNC: 9 MG/DL (ref 8.5–10.5)
CHLORIDE BLD-SCNC: 104 MMOL/L (ref 98–107)
CHOLEST SERPL-MCNC: 85 MG/DL
CO2 SERPL-SCNC: 25 MMOL/L (ref 22–31)
CREAT SERPL-MCNC: 1 MG/DL (ref 0.7–1.3)
CREAT UR-MCNC: 140.1 MG/DL
FASTING STATUS PATIENT QL REPORTED: YES
GFR SERPL CREATININE-BSD FRML MDRD: >60 ML/MIN/1.73M2
GLUCOSE BLD-MCNC: 135 MG/DL (ref 70–125)
HBA1C MFR BLD: 9 %
HDLC SERPL-MCNC: 34 MG/DL
LDLC SERPL CALC-MCNC: 33 MG/DL
MICROALBUMIN UR-MCNC: 5.85 MG/DL (ref 0–1.99)
MICROALBUMIN/CREAT UR: 41.8 MG/G
POTASSIUM BLD-SCNC: 5.4 MMOL/L (ref 3.5–5)
PROT SERPL-MCNC: 6.8 G/DL (ref 6–8)
SODIUM SERPL-SCNC: 139 MMOL/L (ref 136–145)
TRIGL SERPL-MCNC: 89 MG/DL

## 2021-03-26 ASSESSMENT — MIFFLIN-ST. JEOR: SCORE: 1617.73

## 2021-03-31 ENCOUNTER — RECORDS - HEALTHEAST (OUTPATIENT)
Dept: HEALTH INFORMATION MANAGEMENT | Facility: CLINIC | Age: 66
End: 2021-03-31

## 2021-04-02 ENCOUNTER — COMMUNICATION - HEALTHEAST (OUTPATIENT)
Dept: CARDIOLOGY | Facility: CLINIC | Age: 66
End: 2021-04-02

## 2021-04-02 DIAGNOSIS — I25.10 CORONARY ARTERY DISEASE DUE TO LIPID RICH PLAQUE: ICD-10-CM

## 2021-04-02 DIAGNOSIS — I25.83 CORONARY ARTERY DISEASE DUE TO LIPID RICH PLAQUE: ICD-10-CM

## 2021-04-02 RX ORDER — ROSUVASTATIN CALCIUM 40 MG/1
40 TABLET, COATED ORAL AT BEDTIME
Qty: 90 TABLET | Refills: 2 | Status: SHIPPED | OUTPATIENT
Start: 2021-04-02 | End: 2021-12-17

## 2021-04-12 ENCOUNTER — COMMUNICATION - HEALTHEAST (OUTPATIENT)
Dept: FAMILY MEDICINE | Facility: CLINIC | Age: 66
End: 2021-04-12

## 2021-04-13 ENCOUNTER — COMMUNICATION - HEALTHEAST (OUTPATIENT)
Dept: FAMILY MEDICINE | Facility: CLINIC | Age: 66
End: 2021-04-13

## 2021-04-15 ENCOUNTER — OFFICE VISIT - HEALTHEAST (OUTPATIENT)
Dept: FAMILY MEDICINE | Facility: CLINIC | Age: 66
End: 2021-04-15

## 2021-04-15 DIAGNOSIS — H26.9 CATARACT OF LEFT EYE, UNSPECIFIED CATARACT TYPE: ICD-10-CM

## 2021-04-15 DIAGNOSIS — I10 ESSENTIAL HYPERTENSION, BENIGN: ICD-10-CM

## 2021-04-15 DIAGNOSIS — E11.9 CONTROLLED TYPE 2 DIABETES MELLITUS WITHOUT COMPLICATION, WITHOUT LONG-TERM CURRENT USE OF INSULIN (H): ICD-10-CM

## 2021-04-15 DIAGNOSIS — Z01.818 PRE-OPERATIVE GENERAL PHYSICAL EXAMINATION: ICD-10-CM

## 2021-04-15 DIAGNOSIS — I25.10 CORONARY ARTERY DISEASE DUE TO LIPID RICH PLAQUE: ICD-10-CM

## 2021-04-15 DIAGNOSIS — I25.83 CORONARY ARTERY DISEASE DUE TO LIPID RICH PLAQUE: ICD-10-CM

## 2021-04-15 ASSESSMENT — MIFFLIN-ST. JEOR: SCORE: 1567.27

## 2021-04-16 ENCOUNTER — AMBULATORY - HEALTHEAST (OUTPATIENT)
Dept: NURSING | Facility: CLINIC | Age: 66
End: 2021-04-16

## 2021-04-16 ENCOUNTER — COMMUNICATION - HEALTHEAST (OUTPATIENT)
Dept: CARDIOLOGY | Facility: CLINIC | Age: 66
End: 2021-04-16

## 2021-04-16 ENCOUNTER — COMMUNICATION - HEALTHEAST (OUTPATIENT)
Dept: FAMILY MEDICINE | Facility: CLINIC | Age: 66
End: 2021-04-16

## 2021-04-16 DIAGNOSIS — I10 ESSENTIAL HYPERTENSION, BENIGN: ICD-10-CM

## 2021-04-16 RX ORDER — AMLODIPINE BESYLATE 5 MG/1
5 TABLET ORAL 2 TIMES DAILY
Qty: 180 TABLET | Refills: 2 | Status: SHIPPED | OUTPATIENT
Start: 2021-04-16 | End: 2022-01-07

## 2021-05-25 ENCOUNTER — RECORDS - HEALTHEAST (OUTPATIENT)
Dept: ADMINISTRATIVE | Facility: CLINIC | Age: 66
End: 2021-05-25

## 2021-05-26 VITALS — HEART RATE: 76 BPM | RESPIRATION RATE: 18 BRPM | SYSTOLIC BLOOD PRESSURE: 128 MMHG | DIASTOLIC BLOOD PRESSURE: 74 MMHG

## 2021-05-27 ENCOUNTER — RECORDS - HEALTHEAST (OUTPATIENT)
Dept: ADMINISTRATIVE | Facility: CLINIC | Age: 66
End: 2021-05-27

## 2021-05-27 VITALS — HEART RATE: 72 BPM | RESPIRATION RATE: 18 BRPM | DIASTOLIC BLOOD PRESSURE: 80 MMHG | SYSTOLIC BLOOD PRESSURE: 130 MMHG

## 2021-05-27 VITALS
TEMPERATURE: 97.9 F | OXYGEN SATURATION: 98 % | SYSTOLIC BLOOD PRESSURE: 120 MMHG | HEART RATE: 69 BPM | DIASTOLIC BLOOD PRESSURE: 58 MMHG

## 2021-05-29 NOTE — TELEPHONE ENCOUNTER
Small amount of glipizide sent to the pharmacy, he is quite overdue for an appointment though, can we help him schedule this?

## 2021-05-29 NOTE — TELEPHONE ENCOUNTER
Who is calling:  The patient.  Reason for Call:  The patient wants Dr. Ellsworth to know that he has an appointment tomorrow for labs but will not be in until after 08-02-19 to see her, due to insurance.    Date of last appointment with primary care: 08-02-18  Okay to leave a detailed message: Yes

## 2021-05-29 NOTE — TELEPHONE ENCOUNTER
RN cannot approve Refill Request    RN can NOT refill this medication PCP messaged that patient is overdue for Labs and Office Visit. Last office visit: 12/15/2016 Cynthia Ellsworth MD Last Physical: 8/2/2018 Last MTM visit: Visit date not found Last visit same specialty: 12/15/2016 Cynthia Ellsworth MD.  Next visit within 3 mo: Visit date not found  Next physical within 3 mo: Visit date not found      Darlene Grover, [facility] Triage/Med Refill 6/23/2019    Requested Prescriptions   Pending Prescriptions Disp Refills     glipiZIDE (GLUCOTROL) 10 MG tablet [Pharmacy Med Name: GLIPIZIDE 10MG TABLETS] 180 tablet 0     Sig: TAKE 1 TABLET(10 MG) BY MOUTH TWICE DAILY       Oral Hypoglycemics Refill Protocol Failed - 6/23/2019  7:22 AM        Failed - Visit with PCP or prescribing provider visit in last 6 months       Last office visit with prescriber/PCP: Visit date not found OR same dept: Visit date not found OR same specialty: 12/15/2016 Cynthia Ellsworth MD Last physical: Visit date not found Last MTM visit: Visit date not found         Next appt within 3 mo: Visit date not found  Next physical within 3 mo: Visit date not found  Prescriber OR PCP: Cynthia Ellsworth MD  Last diagnosis associated with med order: 1. Diabetes (H)  - glipiZIDE (GLUCOTROL) 10 MG tablet [Pharmacy Med Name: GLIPIZIDE 10MG TABLETS]; TAKE 1 TABLET(10 MG) BY MOUTH TWICE DAILY  Dispense: 180 tablet; Refill: 0     If protocol passes may refill for 12 months if within 3 months of last provider visit (or a total of 15 months).           Failed - A1C in last 6 months     Hemoglobin A1c   Date Value Ref Range Status   08/02/2018 6.9 (H) 3.5 - 6.0 % Final               Passed - Microalbumin in last year      Microalbumin, Random Urine   Date Value Ref Range Status   08/02/2018 0.63 0.00 - 1.99 mg/dL Final                  Passed - Blood pressure in last year     BP Readings from Last 1 Encounters:   08/02/18 128/62              Passed - Serum creatinine in last year     Creatinine   Date Value Ref Range Status   08/02/2018 0.80 0.70 - 1.30 mg/dL Final

## 2021-05-30 NOTE — TELEPHONE ENCOUNTER
RN cannot approve Refill Request    RN can NOT refill this medication Protocol failed and NO refill given.         Giulia Ellis, [facility] Triage/Med Refill 6/26/2019    Requested Prescriptions   Pending Prescriptions Disp Refills     metFORMIN (GLUCOPHAGE) 1000 MG tablet [Pharmacy Med Name: METFORMIN 1000MG TABLETS] 180 tablet 0     Sig: TAKE 1 TABLET(1000 MG) BY MOUTH TWICE DAILY WITH MEALS       Metformin Refill Protocol Failed - 6/25/2019  8:24 AM        Failed - Visit with PCP or prescribing provider visit in last 6 months or next 3 months     Last office visit with prescriber/PCP: Visit date not found OR same dept: Visit date not found OR same specialty: 12/15/2016 Cynthia Ellsworth MD Last physical: Visit date not found Last MTM visit: Visit date not found         Next appt within 3 mo: Visit date not found  Next physical within 3 mo: Visit date not found  Prescriber OR PCP: Cynthia Ellsworth MD  Last diagnosis associated with med order: 1. Diabetes (H)  - metFORMIN (GLUCOPHAGE) 1000 MG tablet [Pharmacy Med Name: METFORMIN 1000MG TABLETS]; TAKE 1 TABLET(1000 MG) BY MOUTH TWICE DAILY WITH MEALS  Dispense: 180 tablet; Refill: 0  - glipiZIDE (GLUCOTROL) 10 MG tablet [Pharmacy Med Name: GLIPIZIDE 10MG TABLETS]; TAKE 1 TABLET(10 MG) BY MOUTH TWICE DAILY  Dispense: 180 tablet; Refill: 0     If protocol passes may refill for 12 months if within 3 months of last provider visit (or a total of 15 months).           Failed - A1C in last 6 months     Hemoglobin A1c   Date Value Ref Range Status   06/25/2019 7.4 (H) 3.5 - 6.0 % Final               Passed - Blood pressure in last 12 months     BP Readings from Last 1 Encounters:   08/02/18 128/62             Passed - LFT or AST or ALT in last 12 months     Albumin   Date Value Ref Range Status   06/25/2019 4.5 3.5 - 5.0 g/dL Final     Bilirubin, Total   Date Value Ref Range Status   06/25/2019 0.6 0.0 - 1.0 mg/dL Final     Bilirubin, Direct   Date Value  Ref Range Status   08/11/2015 0.3 <=0.5 mg/dL Final     Alkaline Phosphatase   Date Value Ref Range Status   06/25/2019 127 (H) 45 - 120 U/L Final     AST   Date Value Ref Range Status   06/25/2019 18 0 - 40 U/L Final     ALT   Date Value Ref Range Status   06/25/2019 34 0 - 45 U/L Final     Protein, Total   Date Value Ref Range Status   06/25/2019 6.7 6.0 - 8.0 g/dL Final                Passed - GFR or Serum Creatinine in last 6 months     GFR MDRD Non Af Amer   Date Value Ref Range Status   06/25/2019 >60 >60 mL/min/1.73m2 Final     GFR MDRD Af Amer   Date Value Ref Range Status   06/25/2019 >60 >60 mL/min/1.73m2 Final             Passed - Microalbumin in last year      Microalbumin, Random Urine   Date Value Ref Range Status   08/02/2018 0.63 0.00 - 1.99 mg/dL Final                  glipiZIDE (GLUCOTROL) 10 MG tablet [Pharmacy Med Name: GLIPIZIDE 10MG TABLETS] 180 tablet 0     Sig: TAKE 1 TABLET(10 MG) BY MOUTH TWICE DAILY       Oral Hypoglycemics Refill Protocol Failed - 6/25/2019  8:24 AM        Failed - Visit with PCP or prescribing provider visit in last 6 months       Last office visit with prescriber/PCP: Visit date not found OR same dept: Visit date not found OR same specialty: 12/15/2016 Cynthia Ellsworth MD Last physical: Visit date not found Last MTM visit: Visit date not found         Next appt within 3 mo: Visit date not found  Next physical within 3 mo: Visit date not found  Prescriber OR PCP: Cynthia Ellsworth MD  Last diagnosis associated with med order: 1. Diabetes (H)  - metFORMIN (GLUCOPHAGE) 1000 MG tablet [Pharmacy Med Name: METFORMIN 1000MG TABLETS]; TAKE 1 TABLET(1000 MG) BY MOUTH TWICE DAILY WITH MEALS  Dispense: 180 tablet; Refill: 0  - glipiZIDE (GLUCOTROL) 10 MG tablet [Pharmacy Med Name: GLIPIZIDE 10MG TABLETS]; TAKE 1 TABLET(10 MG) BY MOUTH TWICE DAILY  Dispense: 180 tablet; Refill: 0     If protocol passes may refill for 12 months if within 3 months of last provider visit  (or a total of 15 months).           Failed - A1C in last 6 months     Hemoglobin A1c   Date Value Ref Range Status   06/25/2019 7.4 (H) 3.5 - 6.0 % Final               Passed - Microalbumin in last year      Microalbumin, Random Urine   Date Value Ref Range Status   08/02/2018 0.63 0.00 - 1.99 mg/dL Final                  Passed - Blood pressure in last year     BP Readings from Last 1 Encounters:   08/02/18 128/62             Passed - Serum creatinine in last year     Creatinine   Date Value Ref Range Status   06/25/2019 0.82 0.70 - 1.30 mg/dL Final

## 2021-05-31 VITALS — BODY MASS INDEX: 31.21 KG/M2 | WEIGHT: 194.19 LBS | HEIGHT: 66 IN

## 2021-05-31 VITALS — BODY MASS INDEX: 31.34 KG/M2 | WEIGHT: 195 LBS | HEIGHT: 66 IN

## 2021-05-31 NOTE — PATIENT INSTRUCTIONS - HE
Vinicio Guaman,    It was a pleasure to see you today at the [facility].     My recommendations after this visit include:    Reduce amlodipine to 5 mg a day  Check BP at home    IVANA Wilkins MD, FACC, JOAN

## 2021-05-31 NOTE — PROGRESS NOTES
"Cardiology Progress Note    Assessment:  Coronary artery disease status post acute coronary syndrome(non-ST segment elevations anterior myocardial infarction) and stenting of severe mid LAD lesion in February 2014,  moderate distal left circumflex lesion, no angina  Diabetes mellitus    Obesity    Hypertension, good control  Hypercholesterolemia, excellent LDL control  Lower extremity edema likely due to high-dose amlodipine    Plan:  I think we can reduce dose of amlodipine to 5 mg a day.  He was instructed to check blood pressure at home.    We went over rationale behind stress testing.  At present time he has no exertional symptoms to suggest progression of coronary artery disease.  He and I did not feel that stress test would be helpful.    Follow-up in 1 year    Subjective:   This is 63 y.o. male who comes in today for follow-up visit.  He denies any exertional chest pain.  He continues to ride bicycle several times a week.  He has not had any increasing shortness of breath or heart palpitations.  Last night he developed left elbow pain.  The pain is reproducible by palpation of the medial aspect.    Review of Systems:   General: WNL  Eyes: WNL  Ears/Nose/Throat: WNL  Lungs: WNL  Heart: WNL  Stomach: WNL  Bladder: WNL  Muscle/Joints: WNL  Skin: WNL  Nervous System: WNL  Mental Health: WNL  Blood: WNL  Objective:   /60 (Patient Site: Left Arm, Patient Position: Sitting, Cuff Size: Adult Large)   Pulse 80   Resp 16   Ht 5' 6\" (1.676 m)   Wt 197 lb (89.4 kg)   BMI 31.80 kg/m    Physical Exam:  GENERAL: no distress  NECK: No JVD  LUNGS: Clear to auscultation.  CARDIAC: regular rhythm, S1 & S2 normal.  No heaves, thrills, gallops or murmurs.  ABDOMEN: flat, negative hepatosplenomegaly, soft and non-tender.  EXTREMITIES: No evidence of cyanosis, clubbing or edema.    Current Outpatient Medications   Medication Sig Dispense Refill     aspirin 81 MG EC tablet Take 81 mg by mouth daily.       atorvastatin " (LIPITOR) 80 MG tablet TAKE ONE TABLET BY MOUTH ONCE DAILY 90 tablet 2     atorvastatin (LIPITOR) 80 MG tablet TAKE ONE TABLET BY MOUTH ONCE DAILY 90 tablet 1     blood sugar diagnostic (GLUCOSE BLOOD) Strp 3 (three) times a day. OneTouch Ultra Blue In Vitro Strip       glipiZIDE (GLUCOTROL) 10 MG tablet TAKE 1 TABLET(10 MG) BY MOUTH TWICE DAILY 60 tablet 0     glipiZIDE (GLUCOTROL) 10 MG tablet TAKE 1 TABLET(10 MG) BY MOUTH TWICE DAILY 180 tablet 1     glucosamine HCl/chondroitin rothman (GLUCOSAMINE-CHONDROITIN ORAL) Take 1 tablet by mouth daily.              lisinopril (PRINIVIL,ZESTRIL) 20 MG tablet TAKE 1 TABLET(20 MG) BY MOUTH TWICE DAILY 180 tablet 0     metFORMIN (GLUCOPHAGE) 1000 MG tablet TAKE 1 TABLET(1000 MG) BY MOUTH TWICE DAILY WITH MEALS 180 tablet 1     nitroglycerin (NITRO-BID) 2 % ointment Place 0.5 inches on the skin 2 (two) times a day. To left ear area       ONE TOUCH ULTRASOFT LANCETS MISC 3 (three) times a day. Use to check sugar       amLODIPine (NORVASC) 5 MG tablet Take 1 tablet (5 mg total) by mouth daily. 30 tablet 12     No current facility-administered medications for this visit.        Cardiographics:    Echocardiogram: February 2015   Mild eccentric left ventricular hypertrophy is present.  Left ventricular ejection fraction is visually estimated to be 65%.  No regional wall motion abnormalities.  Nodular thickening of the left coronary leaflet with normal valve  excursion.  Mild, moderate aortic regurgitation is noted.      Stress Test: November 2016   Lexiscan stress nuclear study is negative for inducible myocardial   ischemia or infarction.       Coronary angio: February 2015   Ant STEMI  95% thrombotic lesion in mid LAD, treated successfully with  2.75x16 Promus BYRON  0% residual, Indigo 3 flow pre and post  Moderate disease in distal Cx, and mild disease in RCA  LVEF 65%  EDP 17mmHg    Lab Results:       Lab Results   Component Value Date    CHOL 98 06/25/2019    CHOL 94 03/21/2018     CHOL 88 06/07/2017     Lab Results   Component Value Date    HDL 35 (L) 06/25/2019    HDL 33 (L) 03/21/2018    HDL 32 (L) 06/07/2017     Lab Results   Component Value Date    LDLCALC 43 06/25/2019    LDLCALC 43 03/21/2018    LDLCALC 42 06/07/2017     Lab Results   Component Value Date    TRIG 98 06/25/2019    TRIG 89 03/21/2018    TRIG 68 06/07/2017     BNP   Date Value Ref Range Status   02/16/2015 83 (H) 0 - 48 pg/mL Final       Missael (Jose)  MD Franky

## 2021-06-01 VITALS — WEIGHT: 194.13 LBS | BODY MASS INDEX: 31.2 KG/M2 | HEIGHT: 66 IN

## 2021-06-01 VITALS — HEIGHT: 66 IN | WEIGHT: 192 LBS | BODY MASS INDEX: 30.86 KG/M2

## 2021-06-01 NOTE — PROGRESS NOTES
Assessment & Plan:        1. Routine adult health maintenance    2. Controlled type 2 diabetes mellitus without complication, without long-term current use of insulin (H)      We reviewed goals for treatment and he will continue his current medications. He should follow up in 2-3 mos for repeat fasting labs, sooner if any problems. Next med check in 4-6 mos.  - Comprehensive Metabolic Panel; Future  - Glycosylated Hemoglobin A1c; Future  - Microalbumin, Random Urine; Future    3. Mixed hyperlipidemia     He will continue the lipitor 80 mg daily. We discussed limiting saturated and trans fats in his diet and using more of the good fats such as olive oil, canola oil, flax seed and peanut oil, etc.     - Lipid Cascade; Future  - Comprehensive Metabolic Panel; Future    4. Benign Essential Hypertension     He will continue lisinopril and amlodipine as directed. We reviewed dietary recommendations, including low salt and high fiber diet, and  recommendations for regular exercise/activity.    - Comprehensive Metabolic Panel; Future    5. Coronary artery disease due to lipid rich plaque      He continues to follow up with cardiology on a regular basis.     6. Screening for prostate cancer  - PSA (Prostatic-Specific Antigen), Annual Screen; Future    7. Need for hepatitis C screening test  - Hepatitis C Antibody (Anti-HCV); Future       Patient Counseling:   --Nutrition: Stressed importance of moderation in sodium/caffeine intake, saturated fat and cholesterol, caloric balance, sufficient intake of fresh fruits, vegetables, fiber, calcium, and iron intake  --Exercise: Stressed the importance of regular exercise.   --Substance Abuse: Discussed cessation/primary prevention of tobacco, alcohol, or other drug use; driving or other dangerous activities under the influence; availability of treatment for abuse.   --Injury prevention: Discussed safety belts, safety helmets, smoke detector, smoking near bedding or upholstery.  "  --Dental health: Discussed importance of regular tooth brushing, flossing, and dental  visits.   --We reviewed self testicular exams, guidelines for prostate cancer and colon cancer screening   --Immunizations reviewed.   --Advances Directives were reviewed and he was given a copy of the Honoring Choices Document.    Discussed the patient's BMI with him.  The BMI is above average; BMI management plan is completed     I have had an Advance Directives discussion with the patient.  The following high BMI interventions were performed this visit: encouragement to exercise and lifestyle education regarding diet    Subjective:          Vinicio Guaman is a 64 y.o. male who presents for an annual exam.   The patient reports no concerns.     Fasting today? No  Diabetes      Patient also presents for follow-up of Type 2 diabetes mellitus.  Current symptoms/problems include paresthesia of the feet, right>left. Numbness began after he had been standing on a ladder for a prolonged time. Patient denies foot ulcerations, hypoglycemia , visual disturbances and vomiting. Home sugars: BGs consistently in an acceptable range. Current monitoring regimen: home blood tests - 1 times daily fasting. Any episodes of hypoglycemia? no.  Last dilated eye exam:  8/2019.     Current diabetic medications include oral agents (dual therapy): glipizide (generic), metformin (generic). Weight trend: stable. Prior visit with dietician: yes. Current diet: in general, a \"healthy\" diet  . Current exercise: bicycling. Is He on ACE inhibitor or angiotensin II receptor blocker? Yes lisinopril (generic). Saw cardiology last month in follow up.      Has the patient ever been transfused or tattooed?: no.     Do you have pain that bothers you in your daily life? no     The patient reports that there is not domestic violence in his life.       Fasting today?  No    Healthy Habits:   Regular Exercise: Yes  Sunscreen Use: Yes  Healthy Diet: Yes  Dental Visits " "Regularly: Yes  Seat Belt: Yes  Sexually active: Yes and No  Monthly Self Testicular Exams:  Yes  Colonoscopy: Yes  Lipid Profile: Yes  Glucose Screen: Yes  Prevention of Osteoporosis: No  Last Dexa: No  Guns at Home:  N/A        Immunization History   Administered Date(s) Administered     DT (pediatric) 06/13/2005     Hep A, historic 09/08/2006, 03/27/2013     Pneumo Polysac 23-V 03/26/2010     Td,adult,historic,unspecified 06/04/2007     Tdap 06/04/2007, 07/25/2017     Immunization status: up to date and documented.     The following portions of the patient's history were reviewed and updated as appropriate: allergies, current medications, past family history, past medical history, past social history, past surgical history and problem list.     Review of Systems  A 12 point comprehensive review of systems was negative except as noted.   Nocturia x 2-3  No hesitancy, dribbling   No hematuria. No bowel changes  Declines flu shot      Objective:     Vitals:    09/16/19 1438   BP: 134/60   Patient Position: Sitting   Cuff Size: Adult Regular   Pulse: 74   SpO2: 98%   Weight: 200 lb 8 oz (90.9 kg)   Height: 5' 6\" (1.676 m)      Body mass index is 32.36 kg/m .   Physical Exam  General: Alert, cooperative, no distress  Head: Normocephalic, without obvious abnormality, atraumatic  Eyes: PERRL, conjunctiva/corneas clear, EOM's intact  Ears: Normal TM's and external ear canals, both ears  Nose: Nares normal, septum midline,mucosa normal, no drainage  Throat: Lips, mucosa, and tongue normal; teeth and gums normal  Neck: Supple, symmetrical, trachea midline, no adenopathy;  thyroid normal  Back: Symmetric, no curvature, ROM normal, no CVA tenderness  Lungs: Clear to auscultation bilaterally, respirations unlabored  Heart: Regular rate and rhythm, no murmur, rub, or gallop,  Abdomen: Soft, non-tender, no masses, no organomegaly  Genitourinary: exam deferred   Musculoskeletal: Normal range of motion. No joint swelling or " deformity.   Extremities: Extremities normal, atraumatic, no cyanosis or edema  Skin: Skin color, texture, turgor normal, no rashes or lesions  Lymph nodes: Cervical, supraclavicular, and axillary nodes normal  Neurologic: Grossly normal.   Diabetes foot exam: normal sensation bilaterally. No visible callus or erythema.   Psychiatric: Normal mood and affect.             Results for orders placed or performed in visit on 06/25/19   Lipid Cascade   Result Value Ref Range    Cholesterol 98 <=199 mg/dL    Triglycerides 98 <=149 mg/dL    HDL Cholesterol 35 (L) >=40 mg/dL    LDL Calculated 43 <=129 mg/dL    Patient Fasting > 8hrs? Yes    Glycosylated Hemoglobin A1c   Result Value Ref Range    Hemoglobin A1c 7.4 (H) 3.5 - 6.0 %   Comprehensive Metabolic Panel   Result Value Ref Range    Sodium 138 136 - 145 mmol/L    Potassium 5.1 (H) 3.5 - 5.0 mmol/L    Chloride 106 98 - 107 mmol/L    CO2 22 22 - 31 mmol/L    Anion Gap, Calculation 10 5 - 18 mmol/L    Glucose 148 (H) 70 - 125 mg/dL    BUN 23 (H) 8 - 22 mg/dL    Creatinine 0.82 0.70 - 1.30 mg/dL    GFR MDRD Af Amer >60 >60 mL/min/1.73m2    GFR MDRD Non Af Amer >60 >60 mL/min/1.73m2    Bilirubin, Total 0.6 0.0 - 1.0 mg/dL    Calcium 9.7 8.5 - 10.5 mg/dL    Protein, Total 6.7 6.0 - 8.0 g/dL    Albumin 4.5 3.5 - 5.0 g/dL    Alkaline Phosphatase 127 (H) 45 - 120 U/L    AST 18 0 - 40 U/L    ALT 34 0 - 45 U/L

## 2021-06-03 VITALS
HEIGHT: 66 IN | SYSTOLIC BLOOD PRESSURE: 134 MMHG | DIASTOLIC BLOOD PRESSURE: 60 MMHG | BODY MASS INDEX: 32.22 KG/M2 | OXYGEN SATURATION: 98 % | WEIGHT: 200.5 LBS | HEART RATE: 74 BPM

## 2021-06-03 VITALS — BODY MASS INDEX: 31.66 KG/M2 | HEIGHT: 66 IN | WEIGHT: 197 LBS

## 2021-06-04 NOTE — TELEPHONE ENCOUNTER
Refill Approved    Rx renewed per Medication Renewal Policy. Medication was last renewed on 6/26/19.    Giulia Ellis, [facility] Triage/Med Refill 12/23/2019     Requested Prescriptions   Pending Prescriptions Disp Refills     metFORMIN (GLUCOPHAGE) 1000 MG tablet [Pharmacy Med Name: METFORMIN 1000MG TABLETS] 180 tablet 1     Sig: TAKE 1 TABLET(1000 MG) BY MOUTH TWICE DAILY WITH MEALS       Metformin Refill Protocol Passed - 12/20/2019  7:13 AM        Passed - Blood pressure in last 12 months     BP Readings from Last 1 Encounters:   09/16/19 134/60             Passed - LFT or AST or ALT in last 12 months     Albumin   Date Value Ref Range Status   12/19/2019 4.4 3.5 - 5.0 g/dL Final     Bilirubin, Total   Date Value Ref Range Status   12/19/2019 0.6 0.0 - 1.0 mg/dL Final     Bilirubin, Direct   Date Value Ref Range Status   08/11/2015 0.3 <=0.5 mg/dL Final     Alkaline Phosphatase   Date Value Ref Range Status   12/19/2019 119 45 - 120 U/L Final     AST   Date Value Ref Range Status   12/19/2019 18 0 - 40 U/L Final     ALT   Date Value Ref Range Status   12/19/2019 38 0 - 45 U/L Final     Protein, Total   Date Value Ref Range Status   12/19/2019 6.7 6.0 - 8.0 g/dL Final                Passed - GFR or Serum Creatinine in last 6 months     GFR MDRD Non Af Amer   Date Value Ref Range Status   12/19/2019 >60 >60 mL/min/1.73m2 Final     GFR MDRD Af Amer   Date Value Ref Range Status   12/19/2019 >60 >60 mL/min/1.73m2 Final             Passed - Visit with PCP or prescribing provider visit in last 6 months or next 3 months     Last office visit with prescriber/PCP: Visit date not found OR same dept: Visit date not found OR same specialty: 12/15/2016 Cynthia Ellsworth MD Last physical: 9/16/2019 Last MTM visit: Visit date not found         Next appt within 3 mo: Visit date not found  Next physical within 3 mo: Visit date not found  Prescriber OR PCP: Cynthia Ellsworth MD  Last diagnosis associated with med  order: 1. Diabetes (H)  - metFORMIN (GLUCOPHAGE) 1000 MG tablet [Pharmacy Med Name: METFORMIN 1000MG TABLETS]; TAKE 1 TABLET(1000 MG) BY MOUTH TWICE DAILY WITH MEALS  Dispense: 180 tablet; Refill: 1  - glipiZIDE (GLUCOTROL) 10 MG tablet [Pharmacy Med Name: GLIPIZIDE 10MG TABLETS]; TAKE 1 TABLET(10 MG) BY MOUTH TWICE DAILY  Dispense: 180 tablet; Refill: 1     If protocol passes may refill for 12 months if within 3 months of last provider visit (or a total of 15 months).           Passed - A1C in last 6 months     Hemoglobin A1c   Date Value Ref Range Status   12/19/2019 8.1 (H) 3.5 - 6.0 % Final               Passed - Microalbumin in last year      Microalbumin, Random Urine   Date Value Ref Range Status   12/19/2019 13.19 (H) 0.00 - 1.99 mg/dL Final                  glipiZIDE (GLUCOTROL) 10 MG tablet [Pharmacy Med Name: GLIPIZIDE 10MG TABLETS] 180 tablet 1     Sig: TAKE 1 TABLET(10 MG) BY MOUTH TWICE DAILY       Oral Hypoglycemics Refill Protocol Passed - 12/20/2019  7:13 AM        Passed - Visit with PCP or prescribing provider visit in last 6 months       Last office visit with prescriber/PCP: Visit date not found OR same dept: Visit date not found OR same specialty: 12/15/2016 Cynthia Ellsworth MD Last physical: 9/16/2019 Last MTM visit: Visit date not found         Next appt within 3 mo: Visit date not found  Next physical within 3 mo: Visit date not found  Prescriber OR PCP: Cynthia Ellsworth MD  Last diagnosis associated with med order: 1. Diabetes (H)  - metFORMIN (GLUCOPHAGE) 1000 MG tablet [Pharmacy Med Name: METFORMIN 1000MG TABLETS]; TAKE 1 TABLET(1000 MG) BY MOUTH TWICE DAILY WITH MEALS  Dispense: 180 tablet; Refill: 1  - glipiZIDE (GLUCOTROL) 10 MG tablet [Pharmacy Med Name: GLIPIZIDE 10MG TABLETS]; TAKE 1 TABLET(10 MG) BY MOUTH TWICE DAILY  Dispense: 180 tablet; Refill: 1     If protocol passes may refill for 12 months if within 3 months of last provider visit (or a total of 15 months).            Passed - A1C in last 6 months     Hemoglobin A1c   Date Value Ref Range Status   12/19/2019 8.1 (H) 3.5 - 6.0 % Final               Passed - Microalbumin in last year      Microalbumin, Random Urine   Date Value Ref Range Status   12/19/2019 13.19 (H) 0.00 - 1.99 mg/dL Final                  Passed - Blood pressure in last year     BP Readings from Last 1 Encounters:   09/16/19 134/60             Passed - Serum creatinine in last year     Creatinine   Date Value Ref Range Status   12/19/2019 0.78 0.70 - 1.30 mg/dL Final

## 2021-06-05 ENCOUNTER — RECORDS - HEALTHEAST (OUTPATIENT)
Dept: CARDIOLOGY | Facility: CLINIC | Age: 66
End: 2021-06-05

## 2021-06-05 VITALS
WEIGHT: 194 LBS | SYSTOLIC BLOOD PRESSURE: 130 MMHG | OXYGEN SATURATION: 98 % | DIASTOLIC BLOOD PRESSURE: 60 MMHG | HEART RATE: 65 BPM | HEIGHT: 66 IN | BODY MASS INDEX: 31.18 KG/M2

## 2021-06-05 VITALS
BODY MASS INDEX: 31.66 KG/M2 | HEART RATE: 68 BPM | OXYGEN SATURATION: 97 % | HEIGHT: 66 IN | WEIGHT: 197 LBS | SYSTOLIC BLOOD PRESSURE: 130 MMHG | RESPIRATION RATE: 16 BRPM | DIASTOLIC BLOOD PRESSURE: 70 MMHG

## 2021-06-05 VITALS
DIASTOLIC BLOOD PRESSURE: 84 MMHG | BODY MASS INDEX: 30.05 KG/M2 | SYSTOLIC BLOOD PRESSURE: 142 MMHG | HEIGHT: 66 IN | HEART RATE: 68 BPM | OXYGEN SATURATION: 97 % | WEIGHT: 187 LBS | RESPIRATION RATE: 16 BRPM

## 2021-06-05 VITALS
WEIGHT: 194 LBS | HEART RATE: 72 BPM | BODY MASS INDEX: 31.18 KG/M2 | RESPIRATION RATE: 16 BRPM | DIASTOLIC BLOOD PRESSURE: 72 MMHG | SYSTOLIC BLOOD PRESSURE: 158 MMHG | HEIGHT: 66 IN

## 2021-06-05 VITALS
DIASTOLIC BLOOD PRESSURE: 60 MMHG | HEART RATE: 80 BPM | OXYGEN SATURATION: 98 % | BODY MASS INDEX: 32.27 KG/M2 | HEIGHT: 64 IN | WEIGHT: 189 LBS | SYSTOLIC BLOOD PRESSURE: 128 MMHG

## 2021-06-05 VITALS
HEART RATE: 71 BPM | OXYGEN SATURATION: 98 % | SYSTOLIC BLOOD PRESSURE: 138 MMHG | DIASTOLIC BLOOD PRESSURE: 88 MMHG | BODY MASS INDEX: 31.66 KG/M2 | HEIGHT: 66 IN | WEIGHT: 197 LBS

## 2021-06-05 VITALS — WEIGHT: 187 LBS | HEIGHT: 66 IN | BODY MASS INDEX: 30.05 KG/M2

## 2021-06-05 DIAGNOSIS — R07.9 CHEST PAIN: ICD-10-CM

## 2021-06-10 NOTE — TELEPHONE ENCOUNTER
Wellness Screening Tool  Symptom Screening:  Do you have one of the following NEW symptoms:    Fever (subjective or >100.0)?  No    A new cough?  No    Shortness of breath?  No     Chills? No     New loss of taste or smell? No     Generalized body aches? No     New persistent headache? No     New sore throat? No     Nausea, vomiting, or diarrhea?  No    Within the past 3 weeks, have you been exposed to someone with a known positive illness below:    COVID-19 (known or suspected)?  No    Chicken pox?  No    Mealses?  No    Pertussis?  No    Patient notified of visitor policy- They may have one person accompany them to their appointment, but they will need to wear a mask and will be screened upon arrival for symptoms: No  Pt informed to wear a mask: Yes  Pt notified if they develop any symptoms listed above, prior to their appointment, they are to call the clinic directly at [phone] for further instructions.  Yes  Patient's appointment status: Patient will be seen in clinic as scheduled on 8/25/20

## 2021-06-11 NOTE — PROGRESS NOTES
"Cardiology Progress Note    Assessment:  Coronary artery disease status post acute coronary syndrome(non-ST segment elevations anterior myocardial infarction) and stenting of severe mid LAD lesion in February 2014, stable, no angina, moderate distal left circumflex lesion, negative nuclear stress test    Diabetes mellitus    Obesity    Hypertension, good control  Hyperlipidemia, good LDL control      Plan:  Blood pressure was mildly elevated in the office today.  He states that all home readings are less than 130 systolic.  I do not think I will make any medication changes today.  I encouraged him to stay physically active.  I will see him for follow-up in 1 year    Subjective:   This is 61 y.o. male who comes in today for follow-up visit.  He has done well.  He denies development of chest pain or shortness of breath.  Frequently he goes for long bicycle rides.  He does not experience any chest discomfort during those physical activities.  He has been trying to lose some weight.  According to our scale he is about 10 pounds less than he was this time last year.    Review of Systems:   General: WNL  Eyes: WNL  Ears/Nose/Throat: WNL  Lungs: WNL  Heart: WNL  Stomach: WNL  Bladder: WNL  Muscle/Joints: WNL  Skin: WNL  Nervous System: WNL  Mental Health: WNL     Blood: WNL    Objective:   /64 (Patient Site: Right Arm, Patient Position: Sitting, Cuff Size: Adult Large)  Pulse 72  Resp 16  Ht 5' 6\" (1.676 m)  Wt 195 lb (88.5 kg)  BMI 31.47 kg/m2  Physical Exam:  GENERAL: no distress  NECK: No JVD  LUNGS: Clear to auscultation.  CARDIAC: regular rhythm, S1 & S2 normal.  No heaves, thrills, gallops or murmurs.  ABDOMEN: flat, negative hepatosplenomegaly, soft and non-tender.  EXTREMITIES: No evidence of cyanosis, clubbing or edema.    Current Outpatient Prescriptions   Medication Sig Dispense Refill     amLODIPine (NORVASC) 5 MG tablet TAKE ONE TABLET BY MOUTH TWICE DAILY 180 tablet 2     aspirin 81 MG EC tablet " Take 81 mg by mouth daily.       blood sugar diagnostic (GLUCOSE BLOOD) Strp 3 (three) times a day. OneTouch Ultra Blue In Vitro Strip       glipiZIDE (GLUCOTROL) 10 MG tablet TAKE 1 TABLET BY MOUTH TWICE DAILY 180 tablet 1     metFORMIN (GLUCOPHAGE) 1000 MG tablet TAKE 1 TABLET BY MOUTH TWICE DAILY WITH MEALS 180 tablet 1     nitroglycerin (NITRO-BID) 2 % ointment Place 0.5 inches on the skin 2 (two) times a day. To left ear area       ONE TOUCH ULTRASOFT LANCETS MISC 3 (three) times a day. Use to check sugar       atorvastatin (LIPITOR) 80 MG tablet TAKE ONE TABLET BY MOUTH ONCE DAILY 90 tablet 2     lisinopril (PRINIVIL,ZESTRIL) 20 MG tablet TAKE 1 TABLET(20 MG) BY MOUTH TWICE DAILY 180 tablet 2     No current facility-administered medications for this visit.        Cardiographics:    Echocardiogram: February 2015 Mild eccentric left ventricular hypertrophy is present.  Left ventricular ejection fraction is visually estimated to be 65%.  No regional wall motion abnormalities.  Nodular thickening of the left coronary leaflet with normal valve  excursion.  Mild, moderate aortic regurgitation is noted.      Stress Test: November 2016 Lexiscan stress nuclear study is negative for inducible myocardial   ischemia or infarction.       Coronary angio: February 2015   Ant STEMI  95% thrombotic lesion in mid LAD, treated successfully with  2.75x16 Promus BYRON  0% residual, Indigo 3 flow pre and post  Moderate disease in distal Cx, and mild disease in RCA  LVEF 65%  EDP 17mmHg    Lab Results:       Lab Results   Component Value Date    CHOL 88 06/07/2017    CHOL 100 11/11/2016    CHOL 121 06/27/2016     Lab Results   Component Value Date    HDL 32 (L) 06/07/2017    HDL 33 (L) 11/11/2016    HDL 36 (L) 06/27/2016     Lab Results   Component Value Date    LDLCALC 42 06/07/2017    LDLCALC 41 11/11/2016    LDLCALC 52 06/27/2016     Lab Results   Component Value Date    TRIG 68 06/07/2017    TRIG 130 11/11/2016    TRIG 167 (H)  06/27/2016     No components found for: CHOLHDL  BNP   Date Value Ref Range Status   02/16/2015 83 (H) 0 - 48 pg/mL Final       Missael (Jose)  MD Franky

## 2021-06-11 NOTE — TELEPHONE ENCOUNTER
Refill Request  Did you contact pharmacy: Yes  Medication name:   Requested Prescriptions     Pending Prescriptions Disp Refills     glipiZIDE (GLUCOTROL) 10 MG tablet 180 tablet 2     Who prescribed the medication: Cynthia Ellsworth MD  Requested [facility]acy: [facility]  Is patient out of medication: N/A  Patient notified refills processed in 3 business days:  N/A  Okay to leave a detailed message: yes

## 2021-06-11 NOTE — TELEPHONE ENCOUNTER
Refill Approved    Rx renewed per Medication Renewal Policy. Medication was last renewed on 12/23/19, last OV 9/18/20.    Darlene Grover, [facility] Triage/Med Refill 9/20/2020     Requested Prescriptions   Pending Prescriptions Disp Refills     glipiZIDE (GLUCOTROL) 10 MG tablet 180 tablet 2       Oral Hypoglycemics Refill Protocol Passed - 9/18/2020  3:31 PM        Passed - Visit with PCP or prescribing provider visit in last 6 months       Last office visit with prescriber/PCP: Visit date not found OR same dept: Visit date not found OR same specialty: 12/15/2016 Cynthia Ellsworth MD Last physical: 9/18/2020 Last MTM visit: Visit date not found         Next appt within 3 mo: Visit date not found  Next physical within 3 mo: Visit date not found  Prescriber OR PCP: Cynthia Ellsworth MD  Last diagnosis associated with med order: 1. Diabetes (H)  - glipiZIDE (GLUCOTROL) 10 MG tablet  Dispense: 180 tablet; Refill: 2     If protocol passes may refill for 12 months if within 3 months of last provider visit (or a total of 15 months).           Passed - A1C in last 6 months     Hemoglobin A1c   Date Value Ref Range Status   09/18/2020 7.2 (H) <=5.6 % Final     Comment:     Normal <5.7% Prediabete 5.7-6.4% Diabletes 6.5% or higher - adopted from ADA consensus guidelines               Passed - Microalbumin in last year      Microalbumin, Random Urine   Date Value Ref Range Status   12/19/2019 13.19 (H) 0.00 - 1.99 mg/dL Final                  Passed - Blood pressure in last year     BP Readings from Last 1 Encounters:   09/18/20 138/88             Passed - Serum creatinine in last year     Creatinine   Date Value Ref Range Status   09/18/2020 0.81 0.70 - 1.30 mg/dL Final

## 2021-06-11 NOTE — TELEPHONE ENCOUNTER
Refill Approved    Rx renewed per Medication Renewal Policy. Medication was last renewed on 12/23/19, last OV 9/18/20.    Darlene Grover, [facility] Triage/Med Refill 9/20/2020     Requested Prescriptions   Pending Prescriptions Disp Refills     metFORMIN (GLUCOPHAGE) 1000 MG tablet 180 tablet 2       Metformin Refill Protocol Passed - 9/18/2020  3:11 PM        Passed - Blood pressure in last 12 months     BP Readings from Last 1 Encounters:   09/18/20 138/88             Passed - LFT or AST or ALT in last 12 months     Albumin   Date Value Ref Range Status   09/18/2020 4.5 3.5 - 5.0 g/dL Final     Bilirubin, Total   Date Value Ref Range Status   09/18/2020 0.6 0.0 - 1.0 mg/dL Final     Bilirubin, Direct   Date Value Ref Range Status   08/11/2015 0.3 <=0.5 mg/dL Final     Alkaline Phosphatase   Date Value Ref Range Status   09/18/2020 114 45 - 120 U/L Final     AST   Date Value Ref Range Status   09/18/2020 14 0 - 40 U/L Final     ALT   Date Value Ref Range Status   09/18/2020 31 0 - 45 U/L Final     Protein, Total   Date Value Ref Range Status   09/18/2020 6.8 6.0 - 8.0 g/dL Final                Passed - GFR or Serum Creatinine in last 6 months     GFR MDRD Non Af Amer   Date Value Ref Range Status   09/18/2020 >60 >60 mL/min/1.73m2 Final     GFR MDRD Af Amer   Date Value Ref Range Status   09/18/2020 >60 >60 mL/min/1.73m2 Final             Passed - Visit with PCP or prescribing provider visit in last 6 months or next 3 months     Last office visit with prescriber/PCP: Visit date not found OR same dept: Visit date not found OR same specialty: 12/15/2016 Cynthia Ellsworth MD Last physical: 9/18/2020 Last MTM visit: Visit date not found         Next appt within 3 mo: Visit date not found  Next physical within 3 mo: Visit date not found  Prescriber OR PCP: Cynthia Ellsworth MD  Last diagnosis associated with med order: 1. Diabetes (H)  - metFORMIN (GLUCOPHAGE) 1000 MG tablet  Dispense: 180 tablet;  Refill: 2     If protocol passes may refill for 12 months if within 3 months of last provider visit (or a total of 15 months).           Passed - A1C in last 6 months     Hemoglobin A1c   Date Value Ref Range Status   09/18/2020 7.2 (H) <=5.6 % Final     Comment:     Normal <5.7% Prediabete 5.7-6.4% Diabletes 6.5% or higher - adopted from ADA consensus guidelines               Passed - Microalbumin in last year      Microalbumin, Random Urine   Date Value Ref Range Status   12/19/2019 13.19 (H) 0.00 - 1.99 mg/dL Final

## 2021-06-11 NOTE — PROGRESS NOTES
Assessment & Plan:        1. Routine adult health maintenance    2. Controlled type 2 diabetes mellitus without complication, without long-term current use of insulin (H)     He will continue his same medications pending lab results. We reviewed dietary recommendations, including low salt and high fiber diet, and  recommendations for regular exercise/activity. We discussed limiting saturated and trans fats in his diet and using more of the good fats such as olive oil, canola oil, flax seed and peanut oil, etc    - Glycosylated Hemoglobin A1c    3. Benign Essential Hypertension     BP is under acceptable control and she will continue amlodipine and lisinopril as directed.  - Comprehensive Metabolic Panel    4. Mixed hyperlipidemia  - Comprehensive Metabolic Panel  - Lipid [place] FASTING    5. Coronary artery disease due to lipid rich plaque  - Lipid [place] FASTING       Patient Counseling:   --Nutrition: Stressed importance of moderation in sodium/caffeine intake, saturated fat and cholesterol, caloric balance, sufficient intake of fresh fruits, vegetables, fiber, calcium, and iron intake  --Exercise: Stressed the importance of regular exercise.   --Substance Abuse: Discussed cessation/primary prevention of tobacco, alcohol, or other drug use; driving or other dangerous activities under the influence; availability of treatment for abuse.   --Injury prevention: Discussed safety belts, safety helmets, smoke detector, smoking near bedding or upholstery.   --Dental health: Discussed importance of regular tooth brushing, flossing, and dental  visits.   --We reviewed self testicular exams, guidelines for prostate cancer and colon cancer screening   --Immunizations reviewed.   --Advances Directives were reviewed and he was given a copy of the [employer] Document.    Discussed the patient's BMI with him.  The BMI is above average; BMI management plan is completed     I have had an Advance Directives discussion  "with the patient.  The following high BMI interventions were performed this visit: encouragement to exercise and lifestyle education regarding diet    Subjective:       Vinicio Guaman is a 65 y.o. male who presents for an annual exam.   The patient reports no concerns.        Has the patient ever been transfused or tattooed?: no.     Do you have pain that bothers you in your daily life? no     The patient reports that there is not domestic violence in his life.       Fasting today?  Yes    Healthy Habits:   Regular Exercise: Yes  Sunscreen Use: Yes  Healthy Diet: Yes  Dental Visits Regularly: Yes  Seat Belt: Yes  Sexually active: No  Monthly Self Testicular Exams:  Yes  Colonoscopy: Yes  Lipid Profile: Yes  Glucose Screen: Yes  Prevention of Osteoporosis: No  Last Dexa: No  Guns at Home:  No      Immunization History   Administered Date(s) Administered     DT (pediatric) 06/13/2005     Hep A, historic 09/08/2006, 03/27/2013     Pneumo Polysac 23-V 03/26/2010     Td,adult,historic,unspecified 06/04/2007     Tdap 06/04/2007, 07/25/2017     Immunization status: up to date and documented.     The following portions of the patient's history were reviewed and updated as appropriate: allergies, current medications, past family history, past medical history, past social history, past surgical history and problem list.     Review of Systems  A 12 point comprehensive review of systems was negative except as noted.   Had pasta last night - blood sugars 200 this am  Planning flu shot next week - camping this weekend        Objective:     Vitals:    09/18/20 0838   BP: 138/88   Patient Position: Sitting   Cuff Size: Adult Regular   Pulse: 71   SpO2: 98%   Weight: 197 lb (89.4 kg)   Height: 5' 6\" (1.676 m)      Body mass index is 31.8 kg/m .   Physical Exam  General: Alert, cooperative, no distress  Head: Normocephalic, without obvious abnormality, atraumatic  Eyes: PERRL, conjunctiva/corneas clear, EOM's intact  Ears: Normal " TM's and external ear canals, both ears  Nose: Nares normal, septum midline,mucosa normal, no drainage  Throat: Lips, mucosa, and tongue normal; teeth and gums normal  Neck: Supple, symmetrical, trachea midline, no adenopathy;  thyroid normal  Back: Symmetric, no curvature, ROM normal, no CVA tenderness  Lungs: Clear to auscultation bilaterally, respirations unlabored  Heart: Regular rate and rhythm, no murmur, rub, or gallop,  Abdomen: Soft, non-tender, no masses, no organomegaly  Genitourinary:    Musculoskeletal: Normal range of motion. No joint swelling or deformity.   Extremities: Extremities normal, atraumatic, no cyanosis or edema  Skin: Skin color, texture, turgor normal, no rashes or lesions  Lymph nodes: Cervical, supraclavicular, and axillary nodes normal  Neurologic: Grossly normal   Psychiatric: Normal mood and affect.        Results for orders placed or performed in visit on 09/18/20   Glycosylated Hemoglobin A1c   Result Value Ref Range    Hemoglobin A1c 7.2 (H) <=5.6 %   Comprehensive Metabolic Panel   Result Value Ref Range    Sodium 139 136 - 145 mmol/L    Potassium 5.7 (H) 3.5 - 5.0 mmol/L    Chloride 105 98 - 107 mmol/L    CO2 25 22 - 31 mmol/L    Anion Gap, Calculation 9 5 - 18 mmol/L    Glucose 157 (H) 70 - 125 mg/dL    BUN 15 8 - 22 mg/dL    Creatinine 0.81 0.70 - 1.30 mg/dL    GFR MDRD Af Amer >60 >60 mL/min/1.73m2    GFR MDRD Non Af Amer >60 >60 mL/min/1.73m2    Bilirubin, Total 0.6 0.0 - 1.0 mg/dL    Calcium 9.6 8.5 - 10.5 mg/dL    Protein, Total 6.8 6.0 - 8.0 g/dL    Albumin 4.5 3.5 - 5.0 g/dL    Alkaline Phosphatase 114 45 - 120 U/L    AST 14 0 - 40 U/L    ALT 31 0 - 45 U/L   Lipid [place] FASTING   Result Value Ref Range    Cholesterol 103 <=199 mg/dL    Triglycerides 139 <=149 mg/dL    HDL Cholesterol 39 (L) >=40 mg/dL    LDL Calculated 36 <=129 mg/dL    Patient Fasting > 8hrs? Yes

## 2021-06-11 NOTE — TELEPHONE ENCOUNTER
Refill Request  Did you contact pharmacy: Yes  Medication name:   Requested Prescriptions     Pending Prescriptions Disp Refills     metFORMIN (GLUCOPHAGE) 1000 MG tablet 180 tablet 2     Who prescribed the medication: Cynthia Ellsworth MD  Requested Pharmacy: [facility]  Is patient out of medication: N/A  Patient notified refills processed in 3 business days:  N/A  Okay to leave a detailed message: yes

## 2021-06-12 NOTE — TELEPHONE ENCOUNTER
Patient called back requesting earlier appointment to see Dr. Obando as he can barely walk. I put him on the schedule for tomorrow, 11/3/20 in [facility]. Patient does have a recent MRI but please enter xray orders if those are needed.

## 2021-06-12 NOTE — PROGRESS NOTES
Assessment & Plan:        1. Routine adult health maintenance    2. Diabetes type 2, uncontrolled     We reviewed his recent labs and his A1c is improved at 7.2. We discussed dietary measures and he will continue working on diet and exercise and the same medications.     3. Benign Essential Hypertension     We reviewed dietary recommendations, including low salt and high fiber diet, and  recommendations for regular exercise/activity.  We discussed limiting saturated and trans fats in his diet and using more of the good fats such as olive oil, canola oil, flax seed and peanut oil, etc      4. Mixed hyperlipidemia    5. Obesity      Patient Counseling:   --Nutrition: Stressed importance of moderation in sodium/caffeine intake, saturated fat and cholesterol, caloric balance, sufficient intake of fresh fruits, vegetables, fiber, calcium, and iron intake  --Exercise: Stressed the importance of regular exercise.   --Substance Abuse: Discussed cessation/primary prevention of tobacco, alcohol, or other drug use; driving or other dangerous activities under the influence; availability of treatment for abuse.   --Injury prevention: Discussed safety belts, safety helmets, smoke detector, smoking near bedding or upholstery.   --Dental health: Discussed importance of regular tooth brushing, flossing, and dental  visits.   --We reviewed self testicular exams, guidelines for prostate cancer and colon cancer screening   --Immunizations reviewed.   --Advances Directives were reviewed and he was given a copy of the [employer] Document.    Discussed the patient's BMI with him.  The BMI is above average; BMI management plan is completed     I have had an Advance Directives discussion with the patient.  The following high BMI interventions were performed this visit: encouragement to exercise and lifestyle education regarding diet    Subjective:       Vinicio Guaman is a 61 y.o. male who presents for an annual exam.   The  "patient reports no concerns.        Has the patient ever been transfused or tattooed?: no.     Do you have pain that bothers you in your daily life? no     The patient reports that there is not domestic violence in his life.       Fasting today?  No    Healthy Habits:   Regular Exercise: Yes, biking  Sunscreen Use: No  Healthy Diet: Yes  Dental Visits Regularly: Yes  Seat Belt: Yes  Sexually active: No  Monthly Self Testicular Exams:  No  Colonoscopy: Yes  Lipid Profile: Yes  Glucose Screen: Yes  Prevention of Osteoporosis: No  Last Dexa: No  Guns at Home:  No      Immunization History   Administered Date(s) Administered     DT (pediatric) 06/13/2005     Hep A, historic 09/08/2006, 03/27/2013     Pneumo Polysac 23-V 03/26/2010     Td, historic 06/04/2007     Tdap 06/04/2007     Immunization status: due today.     The following portions of the patient's history were reviewed and updated as appropriate: allergies, current medications, past family history, past medical history, past social history, past surgical history and problem list.     Review of Systems  Pertinent items are noted in HPI.  Mediterranean diet    Objective:     /60, recheck 148/56  Pulse 72  Temp 97.8  F (36.6  C) (Oral)   Ht 5' 6\" (1.676 m)  Wt 194 lb 3 oz (88.1 kg)  BMI 31.34 kg/m2   Physical Exam  General: Alert, cooperative, no distress  Head: Normocephalic, without obvious abnormality, atraumatic  Eyes: PERRL, conjunctiva/corneas clear, EOM's intact  Ears: Normal TM's and external ear canals, both ears  Nose: Nares normal, septum midline,mucosa normal, no drainage  Throat: Lips, mucosa, and tongue normal; teeth and gums normal  Neck: Supple, symmetrical, trachea midline, no adenopathy;  thyroid normal  Back: Symmetric, no curvature, ROM normal, no CVA tenderness  Lungs: Clear to auscultation bilaterally, respirations unlabored  Heart: Regular rate and rhythm, no murmur, rub, or gallop,  Abdomen: Soft, non-tender, no masses, no " organomegaly  Genitourinary:  exam deferred  Musculoskeletal: Normal range of motion. No joint swelling or deformity.   Extremities: Extremities normal, atraumatic, no cyanosis or edema  Skin: Skin color, texture, turgor normal, no rashes or lesions  Lymph nodes: Cervical, supraclavicular, and axillary nodes normal  Neurologic: Grossly normal   Psychiatric: Normal mood and affect.             Results for orders placed or performed in visit on 06/07/17   Lipid [place]   Result Value Ref Range    Cholesterol 88 <=199 mg/dL    Triglycerides 68 <=149 mg/dL    HDL Cholesterol 32 (L) >=40 mg/dL    LDL Calculated 42 <=129 mg/dL    Patient Fasting > 8hrs? Yes    Glycosylated Hemoglobin A1c   Result Value Ref Range    Hemoglobin A1c 7.2 (H) 3.5 - 6.0 %   Comprehensive Metabolic Panel   Result Value Ref Range    Sodium 139 136 - 145 mmol/L    Potassium 4.8 3.5 - 5.0 mmol/L    Chloride 106 98 - 107 mmol/L    CO2 25 22 - 31 mmol/L    Anion Gap, Calculation 8 5 - 18 mmol/L    Glucose 156 (H) 70 - 125 mg/dL    BUN 22 8 - 22 mg/dL    Creatinine 0.81 0.70 - 1.30 mg/dL    GFR MDRD Af Amer >60 >60 mL/min/1.73m2    GFR MDRD Non Af Amer >60 >60 mL/min/1.73m2    Bilirubin, Total 1.1 (H) 0.0 - 1.0 mg/dL    Calcium 9.5 8.5 - 10.5 mg/dL    Protein, Total 6.8 6.0 - 8.0 g/dL    Albumin 4.5 3.5 - 5.0 g/dL    Alkaline Phosphatase 88 45 - 120 U/L    AST 19 0 - 40 U/L    ALT 34 0 - 45 U/L

## 2021-06-12 NOTE — PROGRESS NOTES
Neurosurgery consultation was requested by: Dr. Ellsworth   Pain: Low back   Radicular Pain is present: Bilateral leg   Motor complaints: Bilateral leg weakness Left is worse   Sensory complaints: Numbness and tingling bilateral legs/feet   Gait and balance issues: Trouble walking - walks with walker   Bowel or bladder issues: None   Duration of SX is: 6 months much worse in the past 2 weeks   The symptoms are worse with: Walking/standing/sitting    The symptoms are better with: Epsom salt warm bath helps   Injury: None   Severity is: Severe   Patient has tried the following conservative measures: Oral steroids   Bell Leblanc,VICENTA,2:07 PM     Modified Oswestry Low back Pain Disability Questionnaire    1. PAIN INTENSITY  0- I can tolerate the pain I have without having to use pain medication  2. PERSONAL CARE  3- I need help, but I am able to manage most of my personal care  3. LIFTING  4- I can only lift very light weights  4. WALKING  4- I can walk only with crutches or a cane  5. SITTING  3- Pain prevents me from sitting more than 30 minutes.  6. STANDING  3- Pain prevents me from standing for more than 30 minutes  7. SLEEPING  0- Pain does not prevent me from sleeping well.  8. SOCIAL LIFE  4- Pain has restricted my social life to my home.  9. TRAVELING  4- My pain restricts my travel to short necessary journeys under   hour.  10. EMPLOYMENT/HOMEMAKING  3- Pain prevents me from doing anything but light duties.    SCORE:28 x2=56%

## 2021-06-12 NOTE — PROGRESS NOTES
NEUROSURGERY CONSULTATION NOTE    11/3/2020     Vinicio Guaman is a 65 y.o. male who is sent to us in consultation by Cynthia Ellsworth  for evaluation of lumbar stenosis.         CONSULTATION ASSESSMENT AND PLAN:    64 yo male presents with a h/o low back pain, abdominal and leg numbness, imbalance, leg weakness and heaviness, urinary hesitancy. On examination, patient has UMN in his lower extremities and a T10 sensory level as well as diffuse LLE weakness. His lumbar MRI shows mild to moderate left L4-5 foraminal narrowing and moderate to severe left L5-S1 foraminal narrowing. Discussed with patient and his wife although his foraminal narrowing could explain part of his weakness it doesn't explain all of his symptoms.  Recommend cervical and thoracic spine MRIs wwo contrast. We will call him with the results to discuss next steps as soon as imaging is available.     I spent more than 45 minutes in this apt, examining the pt, reviewing the scans, reviewing notes from chart, discussing treatment options with risks and benefits and coordinating care. >50 % clinic time was spent in face to face counseling and coordinating care    Dawn Obando     HPI:  64 yo male presents with a h/o abdominal and leg numbness, imbalance, leg weakness and heaviness, low back pain, urinary hesitancy. Over the last six months patient noted numbness and tingling in his lower abdomen diffusely down his legs. His feet are the most numb. He also noted worsening gait during this time period. He has begun using a walker due to to this imbalance and leg heaviness. In the last month, he also notes increased difficulty standing from a sitting position. Some urinary and bowel hesitancy. Denies upper extremity symptoms, sexual dysfunction, or saddle anesthesia.     Prior Spine Surgery:no    Past Medical History:   Diagnosis Date     Coronary artery disease      Diabetes mellitus (H)     Type II     Hyperlipidemia      Hypertension       Myocardial infarct (H) 2015    NSTEMI     Past Surgical History:   Procedure Laterality Date     CORONARY STENT PLACEMENT       REVIEW OF SYSTEMS:  ROS reviewed with pt as documented on pt health form of 11/3/2020.         MEDICATIONS:  Current Outpatient Medications   Medication Sig Dispense Refill     amLODIPine (NORVASC) 5 MG tablet Take 1 tablet (5 mg total) by mouth daily. 30 tablet 12     aspirin 81 MG EC tablet Take 81 mg by mouth daily.       blood sugar diagnostic (GLUCOSE BLOOD) Strp 3 (three) times a day. OneTouch Ultra Blue In Vitro Strip       glipiZIDE (GLUCOTROL) 10 MG tablet TAKE 1 TABLET(10 MG) BY MOUTH TWICE DAILY 180 tablet 3     lisinopriL (PRINIVIL,ZESTRIL) 20 MG tablet TAKE 1 TABLET(20 MG) BY MOUTH TWICE DAILY 180 tablet 1     metFORMIN (GLUCOPHAGE) 1000 MG tablet TAKE 1 TABLET(1000 MG) BY MOUTH TWICE DAILY WITH MEALS 180 tablet 2     nitroglycerin (NITRO-BID) 2 % ointment Place 0.5 inches on the skin 2 (two) times a day. To left ear area       ONE TOUCH ULTRASOFT LANCETS MISC 3 (three) times a day. Use to check sugar       rosuvastatin (CRESTOR) 40 MG tablet Take 1 tablet (40 mg total) by mouth at bedtime. 90 tablet 0     No current facility-administered medications for this visit.          ALLERGIES/SENSITIVITIES:     Allergies   Allergen Reactions     Chlorthalidone Hives and Rash       PERTINENT SOCIAL HISTORY:   Social History     Socioeconomic History     Marital status:      Spouse name: None     Number of children: None     Years of education: None     Highest education level: None   Occupational History     None   Social Needs     Financial resource strain: None     Food insecurity     Worry: None     Inability: None     Transportation needs     Medical: None     Non-medical: None   Tobacco Use     Smoking status: Former Smoker     Quit date: 2015     Years since quittin.7     Smokeless tobacco: Never Used   Substance and Sexual Activity     Alcohol use: None     Drug  "use: None     Sexual activity: Yes     Partners: Female   Lifestyle     Physical activity     Days per week: None     Minutes per session: None     Stress: None   Relationships     Social connections     Talks on phone: None     Gets together: None     Attends [employer] service: None     Active member of club or organization: None     Attends meetings of clubs or organizations: None     Relationship status: None     Intimate partner violence     Fear of current or ex partner: None     Emotionally abused: None     Physically abused: None     Forced sexual activity: None   Other Topics Concern     None   Social History Narrative     None         FAMILY HISTORY:  Family History   Problem Relation Age of Onset     Heart attack Father 60     Hypertension Brother      Hypertension Paternal Uncle      Heart disease Paternal Grandfather      Diabetes Neg Hx      Cancer Neg Hx         PHYSICAL EXAM:   Constitutional: /70   Pulse 68   Resp 16   Ht 5' 6\" (1.676 m)   Wt 197 lb (89.4 kg)   SpO2 97%   BMI 31.80 kg/m       Mental Status: A & O in no acute distress.  Affect is appropriate.  Speech is fluent.  Recent and remote memory are intact.  Attention span and concentration are normal.     Cranial Nerves: CN1: grossly intact per patient recall. CN2: No funduscopic exam performed. CN3,4 & 6: Pupillary light response, lateral and vertical gaze normal.  No nystagmus.  Visual fields are full to confrontation. CN5: Intact to touch CN7: No facial weakness, smile, facial symmetry intact. CN8: Intact to spoken voice. CN9&10: Gag reflex, uvula midline, palate rises with phonation. CN11: Shoulder shrug 5/5 intact bilaterally. CN12: Tongue midline and moves freely from side to side.     Motor: Normal bulk and tone all muscle groups of upper and lower extremities except left leg weakness diffusely 4/5     Sensory: T10 sensory level with worsening in distal extremities in stocking distribution      Coordination:  imbalance with "  tandem gait      Reflexes; supinator, biceps, triceps, knee/ ankle jerk intact- LE 3+>UE . no hoffmans/ + b/l  babinski/ R clonus.    IMAGING: I personally reviewed all radiographic images      Cc:   Cynthia Ellsworth MD  [address] [facility]  [address] [address]

## 2021-06-12 NOTE — PROGRESS NOTES
"Vinicio Guaman is a 65 y.o. male who is being evaluated via a billable telephone visit.      The patient has been notified of following:     \"This telephone visit will be conducted via a call between you and your physician/provider. We have found that certain health care needs can be provided without the need for a physical exam.  This service lets us provide the care you need with a short phone conversation.  If a prescription is necessary we can send it directly to your pharmacy.  If lab work is needed we can place an order for that and you can then stop by our lab to have the test done at a later time.    Telephone visits are billed at different rates depending on your insurance coverage. During this emergency period, for some insurers they may be billed the same as an in-person visit.  Please reach out to your insurance provider with any questions.    If during the course of the call the physician/provider feels a telephone visit is not appropriate, you will not be charged for this service.\"    Patient has given verbal consent to a Telephone visit? Yes    What phone number would you like to be contacted at? [phone]    Patient would like to receive their AVS by AVS Preference: Moises.    Additional provider notes:          Vinicio Guaman is a 65 y.o. male who was contacted for evaluation of low back/tailbone pain. Symptoms have been present for a few weeks and are persisting. The patient has had no prior back problems.  Onset was related to / precipitated by no known injury. The pain is located in the tailbone area and does not radiate. The pain is described as aching and spasm and occurs all day. Symptoms are exacerbated by flexion and walking for more than a few minutes.Trying to walk on treadmill - can only go about 5 min before getting tired. Symptoms are improved by change in body position, ice and rest. He has also tried oral prednisone which provided no symptom relief. He has weakness in the left " "leg and tingling in the left leg associated with the back pain. He notes that he has been limping for a while now also. The patient has no \"red flag\" history indicative of complicated back pain.    The following portions of the patient's history were reviewed and updated as appropriate: allergies, current medications, past family history, past medical history, past social history, past surgical history and problem list.          Assessment/Plan:  1. Acute midline low back pain without sciatica  MR Lumbar Spine Without Contrast    Ambulatory referral to Spine Care    Ambulatory referral to Neurosurgery   2. Weakness of left lower extremity  MR Lumbar Spine Without Contrast    Ambulatory referral to Spine Care    Ambulatory referral to Neurosurgery   3. Paresthesias  MR Lumbar Spine Without Contrast    Ambulatory referral to Spine Care    Ambulatory referral to Neurosurgery   4. Lumbar spondylosis with myelopathy  Ambulatory referral to Spine Care    Ambulatory referral to Neurosurgery        We reviewed the likely/potential etiology(ies) for his leg symptoms and options for evaluation. We will proceed with an MRI of the lumbar spine to rule out pathology. We discussed the leg weakness is cause for concern, and I would recommend spine care independent of the MRI results. He states that he would prefer a neurosurgeon if he would need surgery over an orthopedist. We reviewed activity as tolerated and use of heat and/or ice tid-qid for comfort. He will call or return to clinic with any ongoing or worsening symptoms.    Phone call duration: 15 minutes    Cynthia Ellswotrh MD    "

## 2021-06-13 NOTE — ANESTHESIA CARE TRANSFER NOTE
Last vitals:   Vitals:    11/12/20 1758   BP: 119/41   Pulse: 65   Resp: 12   Temp: 36.8  C (98.2  F)   SpO2: 99%     Patient's level of consciousness is drowsy  Spontaneous respirations: yes  Maintains airway independently: yes  Dentition unchanged: yes  Oropharynx: oral airway in place    QCDR Measures:  ASA# 20 - Surgical Safety Checklist: WHO surgical safety checklist completed prior to induction    PQRS# 430 - Adult PONV Prevention: 4558F - Pt received => 2 anti-emetic agents (different classes) preop & intraop  ASA# 8 - Peds PONV Prevention: NA - Not pediatric patient, not GA or 2 or more risk factors NOT present  PQRS# 424 - Sweta-op Temp Management: 4559F - At least one body temp DOCUMENTED => 35.5C or 95.9F within required timeframe  PQRS# 426 - PACU Transfer Protocol: - Transfer of care checklist used  ASA# 14 - Acute Post-op Pain: ASA14B - Patient did NOT experience pain >= 7 out of 10

## 2021-06-13 NOTE — PROGRESS NOTES
.HOME HEALTH MISSED VISIT NOTIFICATION (FYI)       Your patient who receives home health services missed a visit on: 11/25/20   Type of service missed: PT      Reason for missed visit (pick applicable reason):           Patient/Family refused (explain):  Made several calls to set up an appt with patient.  When patient called back he refused any homecare services stating that his faimily would be assisting him with his care      This request is sent as an inbasket message to , and provider if in our system.

## 2021-06-13 NOTE — TELEPHONE ENCOUNTER
Requesting new verbal order for Physical Therapy start of care assessment to be completed on 11/18/2020 per patient's request.      Patient is declining skilled nursing services at this time, requiring a new verbal order.     Thank you

## 2021-06-13 NOTE — PROGRESS NOTES
MTM Transition of Care Encounter  Assessment & Plan                                                     Post Discharge Medication Reconciliation Status: discharge medications reconciled, continue medications without change    1. Spinal stenosis of cervicothoracic region  Recently hospitalized s/p decompression surgery. Pain has been adequately managed per patient report.     2. Controlled type 2 diabetes mellitus   Most recent A1c of 7.2%, close to goal of <7% per ADA guidelines.     3. Coronary artery disease  Blood pressure is well controlled and meeting goal of <140/90 mm Hg per JNC-8 hypertension guidelines. Appropriately on aspirin for secondary prevention.     4. Mixed hyperlipidemia  Appropriately on a high intensity statin given CAD per ACC/AHA guidelines.      Follow Up  PRN with MTM    Subjective & Objective                                                       Vinicio Guaman is a 65 y.o. male called for a transitions of care visit. he was discharged from [facility] on 11/16/20 for spinal stenosis.    Patient consented to a telehealth visit: Yes  Chief Complaint: Hospital discharge medication review  Medication Adherence/Access: Good; no issues identified.     Per discharge summary:  65-year-old male with past medical history of hypertension , type 2 diabetes mellitus hypertension CAD directly admitted by neurosurgery for urgent decompression of cervical and thoracic spine hospital course complicated by pain issue and urinary retention, neurosurgery okay to discharge today after voiding trial.  Patient feels comfortable going home at the time of discharge. Vitals stable and condition improved significantly.     He was discharged on a pain regimen consisting of APAP 500 mg every 4 hours, diazepam 5 mg every 6 hours PRN, oxycodone 5-10 mg every 4 hours PRN for moderate to severe pain. Bowel regimen of Miralax 17 g two times a day and senna-docusate two times a day. He says his pain has been ok. Jason  was too strong in the hospital. He tends to get nausea from opioids. No issues with constipation.     He has type two diabetes. Medications are metformin 1000 mg two times a day and glipizide 10 mg two times a day. He checks his BG every morning. Lately has been . No lows.     History of CAD. Medications are lisinopril 20 mg two times a day, amlodipine 5 mg two times a day, aspirin 81 mg daily, and rosuvastatin 40 mg daily.     Lab Results   Component Value Date    HGBA1C 7.2 (H) 2020    HGBA1C 8.1 (H) 2019    HGBA1C 7.4 (H) 2019     Lab Results   Component Value Date    MICROALBUR 13.19 (H) 2019    LDLCALC 36 2020    CREATININE 0.77 2020       PMH: reviewed in EPIC   Allergies/ADRs: reviewed in EPIC   Alcohol: not currently  Tobacco:   Social History     Tobacco Use   Smoking Status Former Smoker     Quit date: 2015     Years since quittin.7   Smokeless Tobacco Never Used     Recent Vitals:   BP Readings from Last 3 Encounters:   20 135/63   20 128/74   20 130/70      Wt Readings from Last 3 Encounters:   20 187 lb (84.8 kg)   20 197 lb (89.4 kg)   20 197 lb (89.4 kg)     Pulse Readings from Last 3 Encounters:   20 63   20 76   20 68     ----------------    The patient was given a summary of these recommendations via [employer]    I spent 15 minutes with this patient today;  . All changes were made via collaborative practice agreement with Cynthia Ellsworth MD. A copy of the visit note was provided to the patient's provider.     Claribel Ludwig, PharmD, BCACP  Medication Management (MTM) Pharmacist  [facility]       Telemedicine Visit Details    Type of service:  Telephone     Start Time: 2:00 PM  End Time (time video/phone call stopped): 2:15 PM    Originating Location (pt. Location): Home    Distant Location (provider location):  [place] MEDICATION THERAPY MANAGEMENT  [facility]    Mode of Communication:   Telephone     Current Outpatient Medications   Medication Sig Dispense Refill     acetaminophen (TYLENOL) 500 MG tablet Take 1 tablet (500 mg total) by mouth every 4 (four) hours.  0     amLODIPine (NORVASC) 5 MG tablet Take 1 tablet (5 mg total) by mouth daily. (Patient taking differently: Take 5 mg by mouth 2 (two) times a day. ) 30 tablet 12     aspirin 81 MG EC tablet Take 81 mg by mouth daily.       blood sugar diagnostic (GLUCOSE BLOOD) Strp 3 (three) times a day. OneTouch Ultra Blue In Vitro Strip       diazePAM (VALIUM) 5 MG tablet Take 1 tablet (5 mg total) by mouth every 6 (six) hours as needed for anxiety or muscle spasms. 25 tablet 0     glipiZIDE (GLUCOTROL) 10 MG tablet TAKE 1 TABLET(10 MG) BY MOUTH TWICE DAILY 180 tablet 3     lisinopriL (PRINIVIL,ZESTRIL) 20 MG tablet TAKE 1 TABLET(20 MG) BY MOUTH TWICE DAILY 180 tablet 1     metFORMIN (GLUCOPHAGE) 1000 MG tablet TAKE 1 TABLET(1000 MG) BY MOUTH TWICE DAILY WITH MEALS 180 tablet 2     nitroglycerin (NITRO-BID) 2 % ointment Place 0.5 inches on the skin 2 (two) times a day. To left ear area (to help get blood flow to the area)       ONE TOUCH ULTRASOFT LANCETS MISC 3 (three) times a day. Use to check sugar       oxyCODONE (ROXICODONE) 5 MG immediate release tablet 1 tab for moderate pain every 4 hours as needed. 2 tabs for severe pain every 4 hours as needed. 45 tablet 0     polyethylene glycol (MIRALAX) 17 gram packet Take 1 packet (17 g total) by mouth 2 (two) times a day.  0     rosuvastatin (CRESTOR) 40 MG tablet Take 1 tablet (40 mg total) by mouth at bedtime. 90 tablet 0     senna-docusate (PERICOLACE) 8.6-50 mg tablet Take 1 tablet by mouth 2 (two) times a day.  0     No current facility-administered medications for this visit.

## 2021-06-13 NOTE — PROGRESS NOTES
.HOME HEALTH MISSED VISIT NOTIFICATION (FYI)       Your patient who receives home health services missed a visit on: 11/20/20        Type of service missed: PT      Reason for missed visit (pick applicable reason):                 Other (explain): Called patient x 3 leaving messages with no return call back         This request is sent as an inbasket message to , and provider if in our system.

## 2021-06-13 NOTE — TELEPHONE ENCOUNTER
Patient declined appointment with NP after x-rays for six week follow up.He stated that he would follow up with Dr Obando if there was a problem on the x-rays.

## 2021-06-13 NOTE — PATIENT INSTRUCTIONS - HE
WOUND CARE:  A dressing is not required.      Wash your hands before touching the wound.  Ensure that anyone assisting you in the care of your wound washes her/his hands before touching the wound. Good handwashing can decrease the risk of serious infection.    If you are unable to see your wound, have someone check the wound daily for redness, swelling,or drainage.   You may shower. Wash your wound daily.  Apply mild soap directly to the wound, form a light lather and rinse with running water. Pat the wound dry.  Do not rub, pick, or otherwise help the dermabond come off more quickly.  Do not place tape or adhesive over the dermabond.    Do not submerge the wound under water. No baths or swimming for 6 weeks.     If you develop redness, swelling, drainage, or temp 101 or greater, call our office at [phone]    You may have an appointment in 7-10 days to have your wound checked.      * No lifting, pushing or pulling greater than 5-10 pound (this is about a gallon of milk) for the first 6 weeks after surgery .  *No repetitive bending, twisting, or jarring activities for 6 weeks.  *No overhead work  *No aerobic or strenuous activity  *No activities with increased risk of falls  *You may move about your home as tolerated  *You may walk up and down stairs as tolerated  *You may increase your activity slowly over the next 6 weeks    WALKING PROGRAM: As you can tolerate, walk daily-start with 5-10 minutes of continuous walking. This is in addition to the walking that you do as part of your daily activities. Increase the time that you walk by 5 minutes every couple of days. Do not exceed 30-45 minutes of continuous walking until seen in follow-up. Walking is the best exercise after surgery.  **Listen to your body, if you find that you are more painful or fatigued, you may need to proceed more slowly.    **Do not smoke or expose yourself to second hand smoke. Cigarette smoke can delay healing and cause complications.      DRIVING:  We recommend that you do not drive while taking medications for pain or muscle spasms. Always read and follow the advice on your prescription bottle. If you have questions, speak with your pharmacist.  We recommend that you do not drive while wearing a brace, as it could limit your range of motion.    WORK: If you plan to return to work before you 4-6 weeks appointment, call and discuss with one of the nurses in the neurosurgery office.

## 2021-06-13 NOTE — TELEPHONE ENCOUNTER
Request for Orders    Who s Requesting: Home Care Physical Therapist    Orders being requested: SOC on 11/18 s/p C3-7 laminoplasty and T1-2 laminectomies + 1w1, 2w3 for strengthening HEP, safety with transfers, gait, balance, and endurance training    OT eval with emphasis on ADLs, bathroom safety, DME recommendations    Pt declined all other disciplines at SOC, wants to limit # of clinicians in home due to rising COVID cases.    Where to send Orders: please reply ok to this encounter asap

## 2021-06-13 NOTE — ANESTHESIA POSTPROCEDURE EVALUATION
Patient: Vinicio Guaman  Procedure(s):  procedure 1: Cervical 3-cervical 7 left open door laminoplasty  procedure 2:  thoracic 1-thoracic 2 bilateral laminectomies  use of neuro monitoring (Bilateral)  Anesthesia type: general    Patient location: PACU  Last vitals:   Vitals Value Taken Time   /58 11/12/20 2000   Temp 36.8  C (98.3  F) 11/12/20 1930   Pulse 77 11/12/20 2011   Resp 9 11/12/20 2011   SpO2 99 % 11/12/20 2011   Vitals shown include unvalidated device data.  Post vital signs: stable  Level of consciousness: awake, alert and responds to simple questions  Post-anesthesia pain: pain controlled  Post-anesthesia nausea and vomiting: no  Pulmonary: unassisted, nasal cannula  Cardiovascular: stable and blood pressure at baseline  Hydration: adequate  Anesthetic events: no    QCDR Measures:  ASA# 11 - Sweta-op Cardiac Arrest: ASA11B - Patient did NOT experience unanticipated cardiac arrest  ASA# 12 - Sweta-op Mortality Rate: ASA12B - Patient did NOT die  ASA# 13 - PACU Re-Intubation Rate: ASA13B - Patient did NOT require a new airway mgmt  ASA# 10 - Composite Anes Safety: ASA10A - No serious adverse event    Additional Notes:  Pt stable and doing well.  Signed out to the intensivist by telephone.

## 2021-06-13 NOTE — TELEPHONE ENCOUNTER
[facility] INGRAM left message for patient to cb. He needs to schedule appt to see Dr. Obando to discuss his imaging results.    Please add on to Dr. Obando's schedule on  11/11/20.

## 2021-06-13 NOTE — PROGRESS NOTES
"Vinicio Guaman is status post Left-open door laminoplasty with [employer] maxillofacial titanium plate secured to the lamina with screws and secured to the facet with screws at cervical 3, cervical 4, cervical 5, cervical 6 and cervical 7; Bilateral thoracic 1- thoracic 2 laminectomies on 11/12/2020 with Dr. Obando.  Preoperatively presented with abdominal/below of level of his neeples and leg numbness, imbalance, leg weakness and heaviness, urinary hesitancy.   Today he returns in follow up for staples removal. He is accompanied by his SO. In a wheelchair, able to walk with a walker. He is please with his outcome - reports a complete resolution of abdomeial and T4 numbness. Denies urinary hesitancy. States his legs are still numb, but \"not as heavy as before surgery\". Incisional discomfort is minimal. No arm pain, no sensory or motor disturbance in UE. Wears a [place] J collar.      Surgical wound WNL - CDI, no signs of infection or skin breakdown.  Incision well-healed: good skin approximation, no redness or visible/palpable edema, no tenderness to palpation.  PT. AF, denies fever, chills or sweats.  Pt. reports that the symptoms are improved from pre-op.    Staples - intact removed without difficulty. Wound prepped with Betadine before and after removal.  Surrounding skin has no signs of breakdown.  Verbal instructions regarding incision care are given.  Pt. advised to call us if any s/s of infection noted - all discussed in details.      Tatianna Sim, RN, CNRN    "

## 2021-06-13 NOTE — ANESTHESIA PROCEDURE NOTES
Arterial Line  Reason for Procedure: hemodynamic monitoring  Patient location during procedure: Pre-op  Start time: 11/12/2020 11:41 AM  End time: 11/12/2020 11:48 AM  Staffing:  Performing  Anesthesiologist: Nate Monahan MD  Sterile Precautions:  sterile barriers used during insertion: cap, mask, sterile gloves, large sheet, and hand hygiene used.  Arterial Line:   Immediately prior to procedure a time out was called to verify the correct patient, procedure, equipment, support staff and site/side marked as required  Laterality: right  Location: radial  Prepped with: ChloroPrep    Needle gauge: 20 G  Number of Attempts: 1  Secured with: tape and transparent dressing  Flushed with: saline  1% lidocaine local anesthesia used for skin prep.   See MAR for additional medications given.  Ultrasound evaluation of access site: yes  Vessel patent by US exam    Concurrent real time visualization of needle entry    Permanent ultrasound image captured  Additional Notes:  1st pass successful cannulation of radial artery via ultrasound guidance. Patient tolerated the procedure well without sedation. No apparent complications.

## 2021-06-13 NOTE — PROGRESS NOTES
66 yo male presents with a h/o low back pain, abdominal and leg numbness, imbalance, leg weakness and heaviness, urinary hesitancy. On examination, patient has UMN in his lower extremities and a T10 sensory level as well as diffuse LLE weakness. His lumbar MRI shows mild to moderate left L4-5 foraminal narrowing and moderate to severe left L5-S1 foraminal narrowing. Discussed with patient and his wife although his foraminal narrowing could explain part of his weakness it doesn't explain all of his symptoms.  Recommend cervical and thoracic spine MRIs wwo contrast. We will call him with the results to discuss next steps as soon as imaging is available.     Numbness has progressed since last visit up to right below level of his nipples T4. We discussed the critical results of his cervical and thoracic MRIs. Patient has multiple bernard of spinal cord compression including likely OPLL C3-5 with spinal cord flattening, auto fusion of C5-6 with likely ventral osteophytes that flatten the spinal cord, moderate spinal stenosis with cord flattening C6-7, severe spinal stenosis at T1-2 due to both ventral and dorsal compression of the spinal cord with likely myelomalacia of the spinal cord. In addition, on patient's thoracic MRI he has dorsal left sided ligamentous hypertrophy at T5-6 causing spinal cord flattening and a disc herniation right paracentral at T7-8 causing spinal cord flattening. The most severe disease appears to be at the T1-2 level. Discussed performing a combining cervical laminoplasty pending review of dynamic xrays with at T1-2 laminectomy. Given the severity of the disease at the T1-2 level discussed patient may possibly need a future anterior procedure in addition to the posterior decompression to fully decompress the spinal cord. We will also evaluate for neurological improvement following this first procedure. He may require additional procedures at the lower thoracic levels of stenosis if has ongoing  symptoms. Risks and benefits were discussed in detail with patient and his wife. They agreed to be admitted urgently for surgery tomorrow.    Dawn Obando MD

## 2021-06-13 NOTE — PROGRESS NOTES
S:  Attempted to see pt for fit/delivery of cervical collars at [facility].  Pt still in Sx,  Directed to leave COs w/ surgery admit.      O:  Pt in Sx    A: Cervical 3-cervical 7 left open door laminoplasty, thoracic 1-thoracic 2 bilateral laminectomies  use of neuro monitoring 2/2 cervical spondylosis with myelopathy, cervical cord compression with myelopathy    P: Delivery of [place], [facility] - 300 CO to surgery admit.  Written instruction related to donning, doffing, use, maintenance, safety and potential hazards provided.  F/U prn.

## 2021-06-13 NOTE — PROGRESS NOTES
Referred by: Elie Ovalle DO; Medical Diagnosis (from order):    Diagnosis Information      Diagnosis    824.8 (ICD-9-CM) - S82.892A (ICD-10-CM) - Closed fracture of left ankle, initial encounter                Physical Therapy -  Initial Evaluation    Visit:  1   Treatment Diagnosis: left: ankle and footsymptoms with increased pain/symptoms, impaired range of motion, impaired muscle length/flexibility, impaired tissue mobility, impaired strength, impaired activity tolerance, impaired joint play/mobility, impaired mobility, impaired balance, impaired motor function/performance/coordination, increased risk for falls, impaired gait, increased swelling  Date of onset: 6/14/2020  Chart reviewed at time of initial evaluation (relevant co-morbidities, allergies, tests and medications listed): Patient Active Problem List:     Aneurysm of thoracic aorta (CMS/HCC)     Anxiety state     Atherosclerotic heart disease of native coronary artery with unspecified angina pectoris (CMS/HCC)     Atherosclerosis of coronary artery     Chronic rhinitis     Conjunctival cyst     Esophageal reflux     Malabsorption     Meibomian gland dysfunction (MGD), bilateral, both upper and lower lids     Mixed hyperlipidemia     Persistent disorder of initiating or maintaining sleep     Pinguecula     Postsurgical percutaneous transluminal coronary angioplasty status     Thoracic or lumbosacral neuritis or radiculitis, unspecified     Umbilical hernia    SUBJECTIVE                                                                                                             Patient was clearing some foliage on his tractor and a tree fell on his left leg. He went to the ER and xray confirmed distal tibia fracture. He was placed in boot for 4 weeks and is now able to wean off as tolerated.        Pain / Symptoms:  Pain rating (out of 10): Current: 1 ; Best: 1; Worst: 9  Location: Left ankle/foot   Quality / Description: stiff, tight, throbbing, sharp,  Vinicio Guaman was last seen on 11/03/2020 for low back pain, abdominal and leg numbness, imbalance, leg weakness and heaviness, urinary hesitancy.  Today he returns in follow up after he had cervical and thoracic MRI done.  He reports a progressed numbness from his bilateral groin area to the level of his nipples. No new symptome in the interim. Uses a walker, gait and balance are stable. No bowel or bladder issues.  Tatianna Sim RN, CNRN     ache, cramping.  Standing: increases pain/symptoms  With Transitions: increases pain/symptoms  Alleviating Factors: avoiding movement in involved area, rest, ice, over-the-counter medication.   Progression since onset: improved  Function:   Limitations / Exacerbation Factors: lower body dressing, house/yard work, grocery shopping, bending/squatting/lifting, kneeling and walking, car transfers and floor transfers, stairs, Able to stand for 1 hour, walk for 5 minutes,   Prior Level of Function: pain free ADLs and IADLs. no limitation in involved extremity,  Patient Goals: independence with ADLs/IADLs, increased motion, increased strength, decreased pain and improved balance    Prior treatment: no therapies  Discharged from hospital, home health, or skilled nursing facility in last 30 days: no    Home Environment:   Patient lives with significant other  Type of home: multiple level home  Assistance available: consistent  Feels safe at home/work/school.  2 or more falls or an unexplained fall with injury in the last year:  No    OBJECTIVE                                                                                                                     Range of Motion (ROM)   (norms in parentheses, degrees unless noted, active unless noted):    Ankle:    - Dorsiflexion (20; negative=lacking to 0, positive=beyond 0):        • Left: 3        • Right: 5     - Plantar Flexion (40-50):        • Left: 25        • Right: 45    - Inversion (30-35):        • Left: 5        • Right: 25    - Eversion (15-20):        • Left: 0        • Right: 10    Strength  (out of 5 unless noted, standard test position unless noted, lbs tested with hand held dynamometer)   Hip:    - Flexion:        • Left: 4+        • Right: 5    - Extension:        • Left: 4        • Right: 5    - Abduction:        • Left: 4-        • Right: 4+  Knee:    - Flexion:        • Left: 4+        • Right: 5    - Extension:        • Left: 4+        • Right: 5  Ankle:     - Dorsiflexion:        • Left: 3+        • Right: 5    - Plantar Flexion:        • Left: 3+        • Right: 5    - Inversion:        • Left: 3+         • Right: 5    - Eversion:        • Left: 3+         • Right: 5    Palpation:     Left: Soleus: tenderness; Peroneus Longus: tenderness; Anterior Tibialis: tenderness; medial malleolus tenderness; lateral malleolus tenderness;   Muscle Flexibility:   Knee Flexors: Left: moderate limitation  Knee Extensors: Left: moderate limitation  Joint Play:   Ankle/Foot:     Subtalar: left: hypomobile;      Talocrural: left: hypomobile;      Midfoot: left: hypomobile;      Forefoot: left: hypomobile;      Hallux Metacarpophalangeal: left: hypomobile;      Ankle/Foot Circumference  Figure 8: Left: 61.5 cm Right: 61 cm    Comments / Details: Outcome Measures:   Lower Extremity Functional Scale: LEFS Calculated Total: 40 (0=extreme difficulty; 80=no difficulty) see flowsheet for additional documentation    TREATMENT                                                                                                                initial evaluation completed  Therapeutic Exercise:  Passive range of motion to left ankle into all planes.  Manual Therapy:  Joint mobilizations in all planes for left tarsals, TMT joints, and calcaneus.  Neuromuscular Re-Education:  Supine Single Leg Ankle Pumps - x10  Supine Ankle Inversion Eversion AROM - x10   Seated Toe Curl - x10   Seated Ankle Alphabet - reviewed  Seated Heel Raise - x10   Seated Toe Raise - x10   Seated Gastroc Stretch with Strap - reviewed  Side to Side Weight Shift with Counter Support - 10 reps - 3 sets - 4x daily - 7x weekly  Gastroc Stretch on Wall - 10 reps - 1 sets - 10 seconds hold - 2x daily - 7x weekly      Patient education on diagnosis, prognosis, and plan of care    Skilled input: verbal instruction/cues, tactile instruction/cues and facilitation    Writer verbally educated and received verbal consent for hand placement,  positioning of patient, and techniques to be performed today from patient for clothing adjustments for techniques, therapist position for techniques and hand placement and palpation for techniques as described above and how they are pertinent to the patient's plan of care.    Home Exercise Program: (*above indicates provided as part of home exercise program)  Access Code: [email or web address]   URL: [email or web address]   Date: 07/20/2020   Prepared by: Kristofer Purvis     Exercises  Supine Single Leg Ankle Pumps - 10 reps - 3 sets - 4x daily - 7x weekly  Supine Ankle Inversion Eversion AROM - 10 reps - 3 sets - 4x daily - 7x weekly  Seated Toe Curl - 10 reps - 3 sets - 2 hold - 4x daily - 7x weekly  Seated Ankle Alphabet - 3 reps - 1 sets - 4x daily - 7x weekly  Seated Heel Raise - 10 reps - 3 sets - 4x daily - 7x weekly  Seated Toe Raise - 10 reps - 3 sets - 4x daily - 7x weekly  Seated Gastroc Stretch with Strap - 10 reps - 1 sets - 10 hold - 4x daily - 7x weekly  Side to Side Weight Shift with Counter Support - 10 reps - 3 sets - 4x daily - 7x weekly  Gastroc Stretch on Wall - 10 reps - 1 sets - 10 seconds hold - 2x daily - 7x weekly      ASSESSMENT                                                                                                             73 year old male patient has reported functional limitations listed above impacted by signs and symptoms consistent with left ankle and foot, increased pain/symptoms, impaired range of motion, impaired muscle length/flexibility, impaired tissue mobility, impaired strength, impaired activity tolerance, impaired joint play/mobility, impaired mobility, impaired balance, impaired motor function/performance/coordination, increased risk for falls, impaired gait and increased swelling.  Presents with signs and symptoms consistent with ankle/foot fracture. Would benefit from skilled care to restore full pain free function.     Prognosis: patient will  benefit from skilled therapy  Rehabilitative Potential: good  Predicted patient presentation: Low (stable) - Patient comorbidities and complexities, as defined above, will have little effect on progress for prescribed plan of care.    Patient Education:   Who will be receiving education: patient  Are they ready to learn: yes  Preferred learning style: verbal, written and demonstration  Barriers to learning: no barriers apparent at this time  Results of above outlined education: Verbalizes understanding and Demonstrates understanding     PLAN                                                                                                                           The following skilled interventions to be implemented to achieve goals listed below:  Manual Therapy (84451)  Neuromuscular Re-Education (30716)  Therapeutic Activity (71559)  Therapeutic Exercise (80083)  Electrical Stimulation (26485//68527)   Heat/Cold (08420)      Frequency / Duration: 2 times per week tapering as patient progresses for an estimated total of 12 visits for 8 weeks    patient involved in and agreed to plan of care and goals.  Patient has been given attendance policy at time of initial evaluation.    Suggestions for next session as indicated: Progress per plan of care    GOALS                                                                                                                       Long Term Goals: To be met by end of plan of care:       Will restore full pain free left ankle range of motion to normalize gait (Walking and moving (mobility))  Will improve left ankle/knee strength to Within normal limits to allow patient to navigate stairs without gait deviations.  (Walking and moving (mobility))  Will be able to stand/walk for over 1 hour to allow patient to perform household tasks and improve function.   (walking and moving (mobility))      Procedures and total treatment time documented Time Entry flowsheet.

## 2021-06-14 NOTE — TELEPHONE ENCOUNTER
Patient calling for a refill of robaxin.     DOS: 11/12/2020  Procedure: procedure 1: Cervical 3-cervical 7 left open door laminoplasty  procedure 2:  thoracic 1-thoracic 2 bilateral laminectomies  use of neuro monitoring  Surgeon: Topher    Current symptom(s): neck is sore and tired, spasms in relation to weaning from brace      Current pain management: robaxin (two per day) and ibuprofen prn.       Last fill: 11/23/2020  Next visit: 1/5/2021 with Topher    Pharmacy verified.     Informed patient request will be forwarded to care team.     Liyah Ham RN

## 2021-06-14 NOTE — TELEPHONE ENCOUNTER
----- Message from Francoise Fajardo RN sent at 1/19/2021  8:33 AM CST -----  Regarding: FW: WTZ PATIENT    ----- Message -----  From: Yadi Hua HUC  Sent: 1/18/2021   8:10 AM CST  To: Giulia Worthy RN  Subject: WTZ PATIENT                                      Patient has already contacted their pharmacy. The medication or refill issue is below:    Primary Cardiologist: SALVATORE    Medication: amLODIPine (NORVASC) 5 MG tablet    Issue / Concern: Patient has enough pills for a couple of weeks, he is requesting next refill to be a 90 day supply. He isn't due to see SALVATORE until 4/2021.    Preferred Pharmacy/City:[facility] [place], [address] [facility] [address]    Best Phone Number for Patient: [phone]    Additional Info:      Refill granted. -kcl

## 2021-06-14 NOTE — PROGRESS NOTES
66 yo male who is s/p cervical 3-cervical 7 left open door laminoplasty and  thoracic 1-thoracic 2 bilateral laminectomies on 11/12/20. Overall doing well.Feels neurologically he has had significant improvement.  Stopped using the walker. Numbness only in his feet when he curls his feet otherwise his paresthesias have resolved as well. XR reviewed and appear stable. He will continue PT and neck strengthening. Wean cervical collar. He will call when he would like to proceed with hip injections will refer to spine center.      Dawn Obando MD

## 2021-06-14 NOTE — TELEPHONE ENCOUNTER
Amlodipine 5MG    Last Med Check: 10/23/2020    Next med check due on: ?    CSA on File: N/A    Future Appointment Scheduled ? None    Last Med Refill? 08/14/2019    Rob Winkler LPN

## 2021-06-14 NOTE — TELEPHONE ENCOUNTER
RN cannot approve Refill Request    RN can NOT refill this medication pt reports is taking 5 mg 2 times daily. Last office visit: 12/15/2016 Cynthia Ellsworth MD Last Physical: 9/18/2020 Last MTM visit: Visit date not found Last visit same specialty: 12/15/2016 Cynthia Ellsworth MD.  Next visit within 3 mo: Visit date not found  Next physical within 3 mo: Visit date not found      Giulia Ellis, [facility] Triage/Med Refill 1/12/2021    Requested Prescriptions   Pending Prescriptions Disp Refills     amLODIPine (NORVASC) 5 MG tablet 30 tablet 12     Sig: Take 1 tablet (5 mg total) by mouth daily.       Calcium-Channel Blockers Protocol Passed - 1/12/2021 10:46 AM        Passed - PCP or prescribing provider visit in past 12 months or next 3 months     Last office visit with prescriber/PCP: 12/15/2016 Cynthia Ellsworth MD OR same dept: Visit date not found OR same specialty: 12/15/2016 Cynthia Ellsworth MD  Last physical: 9/18/2020 Last MTM visit: Visit date not found   Next visit within 3 mo: Visit date not found  Next physical within 3 mo: Visit date not found  Prescriber OR PCP: Cynthia Ellsworth MD  Last diagnosis associated with med order: 1. Benign Essential Hypertension  - amLODIPine (NORVASC) 5 MG tablet; Take 1 tablet (5 mg total) by mouth daily.  Dispense: 30 tablet; Refill: 12    If protocol passes may refill for 12 months if within 3 months of last provider visit (or a total of 15 months).             Passed - Blood pressure filed in past 12 months     BP Readings from Last 1 Encounters:   01/12/21 142/84

## 2021-06-15 PROBLEM — E11.9 CONTROLLED TYPE 2 DIABETES MELLITUS WITHOUT COMPLICATION (H): Status: ACTIVE | Noted: 2017-01-02

## 2021-06-15 NOTE — PATIENT INSTRUCTIONS - HE
Vinicio Guaman,    It was a pleasure to see you today at the [facility].     My recommendations after this visit include:    Same medications  Check BP at home  Echo before follow up visit    IVANA Wilkins MD, FACC, JOAN

## 2021-06-16 PROBLEM — M48.00 SPINAL STENOSIS: Status: ACTIVE | Noted: 2020-11-11

## 2021-06-16 PROBLEM — G99.2 MYELOPATHY CONCURRENT WITH AND DUE TO SPINAL STENOSIS OF THORACIC REGION (H): Status: ACTIVE | Noted: 2020-11-13

## 2021-06-16 PROBLEM — M47.12 CERVICAL SPONDYLOSIS WITH MYELOPATHY: Status: ACTIVE | Noted: 2020-11-11

## 2021-06-16 PROBLEM — Z98.890 HISTORY OF LAMINECTOMY: Status: ACTIVE | Noted: 2020-11-16

## 2021-06-16 PROBLEM — G95.20 CERVICAL CORD COMPRESSION WITH MYELOPATHY (H): Status: ACTIVE | Noted: 2020-11-11

## 2021-06-16 PROBLEM — I35.1 NONRHEUMATIC AORTIC VALVE INSUFFICIENCY: Status: ACTIVE | Noted: 2021-03-01

## 2021-06-16 PROBLEM — M48.04 MYELOPATHY CONCURRENT WITH AND DUE TO SPINAL STENOSIS OF THORACIC REGION (H): Status: ACTIVE | Noted: 2020-11-13

## 2021-06-16 NOTE — PROGRESS NOTES
[facility]  [address],   [place] [facility] [address]  Dept: [phone]  Dept Fax: [phone]  Primary Provider: Cynthia Ellsworth MD  Pre-op Performing Provider: PARRISH RYOAL    PREOPERATIVE EVALUATION:  Today's date: 4/15/2021    Vinicio Guaman is a 65 y.o. male who presents for a preoperative evaluation.    Surgical Information:  Surgery/Procedure: Left Cataract Surgery  Surgery Location: [facility]   Surgeon: Dr. Edson Chan  Surgery Date: 4/20/21  Time of Surgery: Unknown.   Where patient plans to recover: At home with family  Fax number for surgical facility: [phone].    Type of Anesthesia Anticipated: Choice    Assessment & Plan      The proposed surgical procedure is considered LOW risk.    Pre-operative general physical examination  Medically optimized for surgery    Cataract of left eye, unspecified cataract type  Planned surgical correction    Benign Essential Hypertension  Well-controlled    Coronary artery disease due to lipid rich plaque  Stable    Controlled type 2 diabetes mellitus without complication, without long-term current use of insulin (H)  Well-controlled    Medication instructions:  Hold diabetic medications the morning of surgery.  Okay to hold lisinopril the morning of surgery.     Risks and Recommendations:  The patient has the following additional risks and recommendations for perioperative complications:   - No identified additional risk factors other than previously addressed      RECOMMENDATION:  APPROVAL GIVEN to proceed with proposed procedure, without further diagnostic evaluation.    31 minutes spent on the date of the encounter doing chart review, history and exam, documentation and further activities per the note    Subjective     HPI related to upcoming procedure: Patient has had slowly progressing worsening of vision.  Eye exam performed showing cataract.  Glasses are not enough  to compensate and so cataract removal was recommended..    Preop Questions 4/14/2021   Have you ever had a heart attack or stroke? YES - 2/16/15    Have you ever had surgery on your heart or blood vessels, such as a stent placement, a coronary artery bypass, or surgery on an artery in your head, neck, heart, or legs? YES - going back to 2/16/15.     Do you have chest pain with activity? No   Do you have a history of  heart failure? No   Do you currently have a cold, bronchitis or symptoms of other infection? No   Do you have a cough, shortness of breath, or wheezing? No   Do you or anyone in your family have previous history of blood clots? No   Do you or does anyone in your family have a serious bleeding problem such as prolonged bleeding following surgeries or cuts? No   Have you ever had problems with anemia or been told to take iron pills? No   Have you had any abnormal blood loss such as black, tarry or bloody stools? No   Have you ever had a blood transfusion? No   Are you willing to have a blood transfusion if it is medically needed before, during, or after your surgery? Yes   Have you or any of your relatives ever had problems with anesthesia? No   Do you have sleep apnea, excessive snoring or daytime drowsiness? No   Do you have any artifical heart valves or other implanted medical devices like a pacemaker, defibrillator, or continuous glucose monitor? No   Do you have artificial joints? No   Are you allergic to latex? No     Health Care Directive:  Patient does not have a Health Care Directive or Living Will: Discussed advance care planning with patient; information given to patient to review.    Preoperative Review of :    reviewed - last oxycodone filled 11/23/2020    See problem list for active medical problems.  Problems all longstanding and stable, except as noted/documented.  See ROS for pertinent symptoms related to these conditions.      Review of Systems  CONSTITUTIONAL: NEGATIVE for fever,  chills, change in weight  ENT/MOUTH: Negative for ear, mouth, and throat problems  RESP: NEGATIVE for significant cough or SOB  CV: NEGATIVE for chest pain, palpitations or peripheral edema    Patient Active Problem List    Diagnosis Date Noted     Nonrheumatic aortic valve insufficiency 03/01/2021     History of laminectomy 11/16/2020     Urinary retention      Myelopathy concurrent with and due to spinal stenosis of thoracic region (H) 11/13/2020     Spinal stenosis 11/11/2020     Cervical spondylosis with myelopathy 11/11/2020     Cervical cord compression with myelopathy (H) 11/11/2020     Controlled type 2 diabetes mellitus without complication (H) 01/02/2017     Coronary artery disease 02/20/2015     Status post insertion of drug-eluting stent into left anterior descending (LAD) artery 02/16/2015     Actinic Keratosis      Obesity      Generalized Osteoarthritis Of The Hand      Shoulder Impingement      Joint Pain, Localized In The Shoulder      Strained Biceps Tendon      Mixed hyperlipidemia      Benign Essential Hypertension      Arthralgia      Past Medical History:   Diagnosis Date     Coronary artery disease      Diabetes mellitus (H)     Type II     Hyperlipidemia      Hypertension      Myocardial infarct (H) 2/2015    NSTEMI     Past Surgical History:   Procedure Laterality Date     CORONARY STENT PLACEMENT       AK C- LAMINOPLASTY, 2 OR MORE Bilateral 11/12/2020    Procedure: procedure 1: Cervical 3-cervical 7 left open door laminoplasty  procedure 2:  thoracic 1-thoracic 2 bilateral laminectomies  use of neuro monitoring;  Surgeon: Dawn Obando MD;  Location: [facility];  Service: Spine     Current Outpatient Medications   Medication Sig Dispense Refill     amLODIPine (NORVASC) 5 MG tablet Take 1 tablet (5 mg total) by mouth 2 (two) times a day. 180 tablet 0     aspirin 81 MG EC tablet Take 81 mg by mouth daily.       glipiZIDE (GLUCOTROL) 10 MG tablet TAKE 1 TABLET(10 MG) BY  "MOUTH TWICE DAILY 180 tablet 3     lisinopriL (PRINIVIL,ZESTRIL) 20 MG tablet TAKE 1 TABLET(20 MG) BY MOUTH TWICE DAILY 180 tablet 1     metFORMIN (GLUCOPHAGE) 1000 MG tablet TAKE 1 TABLET(1000 MG) BY MOUTH TWICE DAILY WITH MEALS 180 tablet 2     rosuvastatin (CRESTOR) 40 MG tablet Take 1 tablet (40 mg total) by mouth at bedtime. 90 tablet 2     ibuprofen (ADVIL,MOTRIN) 600 MG tablet Take 1 tablet (600 mg total) by mouth every 6 (six) hours as needed for pain. 60 tablet 0     methocarbamoL (ROBAXIN) 750 MG tablet Take 1 tablet (750 mg total) by mouth 3 (three) times a day as needed (muscle spasms). 45 tablet 0     nitroglycerin (NITRO-BID) 2 % ointment Place 0.5 inches on the skin 2 (two) times a day. To left ear area (to help get blood flow to the area)       No current facility-administered medications for this visit.        Allergies   Allergen Reactions     Chlorthalidone Hives and Rash       Social History     Tobacco Use     Smoking status: Former Smoker     Quit date: 2015     Years since quittin.1     Smokeless tobacco: Never Used   Substance Use Topics     Alcohol use: Not Currently      Family History   Problem Relation Age of Onset     Heart attack Father 60     Hypertension Brother      Hypertension Paternal Uncle      Heart disease Paternal Grandfather      Diabetes Neg Hx      Cancer Neg Hx      Social History     Substance and Sexual Activity   Drug Use Never        Objective     /62   Pulse 80   Ht 5' 4.25\" (1.632 m)   Wt 189 lb (85.7 kg)   SpO2 98%   BMI 32.19 kg/m    Physical Exam    GENERAL APPEARANCE: healthy, alert and no distress     EYES: EOMI,  PERRL     HENT: ear canals and TM's normal and nose and mouth without ulcers or lesions     NECK: no adenopathy, no asymmetry, masses, or scars and thyroid normal to palpation     RESP: lungs clear to auscultation - no rales, rhonchi or wheezes     CV: regular rates and rhythm, normal S1 S2, no S3 or S4 and no murmur, click or rub   "   ABDOMEN:  soft, nontender, no HSM or masses and bowel sounds normal     MS: extremities normal- no gross deformities noted, no evidence of inflammation in joints, FROM in all extremities.     SKIN: no suspicious lesions or rashes     NEURO: Normal strength and tone, sensory exam grossly normal, mentation intact and speech normal     PSYCH: mentation appears normal. and affect normal/bright     LYMPHATICS: No cervical adenopathy    Recent Labs   Lab Test 03/26/21  0931 11/16/20  1125 11/16/20  0728 11/15/20  0607 11/14/20  0534 11/11/20  1534 11/11/20  1534 11/11/20  1508   HGB  --  8.1* 7.6* 7.6* 7.4*   < >  --  11.8*   PLT  --   --  190 190 175   < >  --  283   INR  --   --   --   --   --   --  1.02 0.97     --   --   --  135*   < >  --  140   K 5.4*  --  4.8  --  4.0   < >  --  4.3   CREATININE 1.00  --   --   --  0.77   < >  --  1.09    < > = values in this interval not displayed.        PRE-OP Diagnostics:   Results for orders placed or performed in visit on 03/26/21   Comprehensive Metabolic Panel   Result Value Ref Range    Sodium 139 136 - 145 mmol/L    Potassium 5.4 (H) 3.5 - 5.0 mmol/L    Chloride 104 98 - 107 mmol/L    CO2 25 22 - 31 mmol/L    Anion Gap, Calculation 10 5 - 18 mmol/L    Glucose 135 (H) 70 - 125 mg/dL    BUN 29 (H) 8 - 22 mg/dL    Creatinine 1.00 0.70 - 1.30 mg/dL    GFR MDRD Af Amer >60 >60 mL/min/1.73m2    GFR MDRD Non Af Amer >60 >60 mL/min/1.73m2    Bilirubin, Total 0.6 0.0 - 1.0 mg/dL    Calcium 9.0 8.5 - 10.5 mg/dL    Protein, Total 6.8 6.0 - 8.0 g/dL    Albumin 4.5 3.5 - 5.0 g/dL    Alkaline Phosphatase 130 (H) 45 - 120 U/L    AST 13 0 - 40 U/L    ALT 22 0 - 45 U/L     Lab Results   Component Value Date    HGBA1C 9.0 (H) 03/26/2021     Most Recent EKG     Units 11/11/20  1513   VENTRATE BPM 63   ATRIALRATE BPM 63   QRSDURATION ms 98   QTINTERVAL ms 436   QTCCALC ms 446   P Axis degrees 22   RAXIS degrees -7   TAXIS degrees 36   MUSEDX  Normal sinus rhythm  Normal ECG  When  compared with ECG of 12-MAY-2015 10:39,  No significant change was found  Confirmed by AMELIA COWART MD LOC:[facility] ([id number]) on 11/12/2020 10:56:51 AM           REVISED CARDIAC RISK INDEX (RCRI)   The patient has the following serious cardiovascular risks for perioperative complications:   - No serious cardiac risks = 0 points    RCRI INTERPRETATION: 0 points: Class I (very low risk - 0.4% complication rate)         Signed Electronically by: Aldo Romero CNP    Copy of this evaluation report is provided to requesting physician.

## 2021-06-16 NOTE — PROGRESS NOTES
"OV     3/26/2021  Assessment:     1. Controlled type 2 diabetes mellitus without complication, without long-term current use of insulin (H)  Glycosylated Hemoglobin A1c    Microalbumin, Random Urine   2. Benign Essential Hypertension  Comprehensive Metabolic Panel   3. Mixed hyperlipidemia  Lipid [place] FASTING   4. Coronary artery disease due to lipid rich plaque     5. History of laminectomy          Plan:        Fasting labs were drawn. Blood sugars are under fair control. A1c is 9.0, up from 7.2 last fall. We reviewed his current medications and he would like to continue the same and work on diet and exercise. He understands indications for urgent evaluation for his cardiac symptoms. We reviewed dietary recommendations, including low salt and high fiber diet, and recommendations for regular exercise/activity. He will plan to follow up in 3-4 mos for repeat fasting labs and med check, sooner if any difficulties.        Subjective:     Diabetes      Vinicio Guaman is a 65 y.o. male who presents for follow-up of Type 2 diabetes mellitus. Compliance with treatment has been good, and compliance with diet has been fair. Current symptoms/problems include none. Patient denies hypoglycemia , paresthesia of the feet, visual disturbances and vomiting. Home sugars: BGs consistently in an acceptable range. Current monitoring regimen: home blood tests - 1 times daily. Any episodes of hypoglycemia? no. Weight trend: stable. Last dilated eye exam 8/27/20.      Current diabetic medications include oral agents (dual therapy): glipizide (Glucotrol), metformin (generic). Current side effects: none. Prior visit with dietician: yes     Current diet: in general, a \"healthy\" diet  .  Current exercise: walking and yard work. Is he on ACE inhibitor or angiotensin II receptor blocker? Yes.  lisinopril (generic)     Fasting today? Yes  Hyperlipidemia & Hypertension      Patient is here for follow-up of hypertension and dyslipidemia. A " "repeat fasting lipid profile was done. Compliance with treatment has been good. Patient denies muscle pain associated with his medications.  Current symptoms: none. Patient denies chest pain, dyspnea, exertional chest pressure/discomfort, lower extremity edema, near-syncope and palpitations. The patient exercises intermittently.      Previous history of cardiac disease includes: coronary artery disease and previous M.I.        He had surgery on his cervical and thoracic spines this past fall, laminoplasties left  C3-C7 and bilat T1-2. He notes ongoing mild numbness in right foot chronically which was worse when he was having his with back/neck issues.      The following portions of the patient's history were reviewed and updated as appropriate: allergies, current medications, past family history, past medical history, past social history, past surgical history and problem list.      Review of Systems  A 12 point comprehensive review of systems was negative except as noted.          Objective:          Vitals:    03/26/21 0917   BP: 130/60   Patient Position: Sitting   Cuff Size: Adult Regular   Pulse: 65   SpO2: 98%   Weight: 194 lb (88 kg)   Height: 5' 6\" (1.676 m)      Body mass index is 31.31 kg/m .   General:    Alert, cooperative, no distress   Head:    Normocephalic, without obvious abnormality, atraumatic   Eyes:    PERRL, conjunctiva/corneas clear, EOM's intact    Ears:    Normal TM's and external ear canals, both ears   Nose:   Nares normal, mucosa normal, no drainage or sinus tenderness   Throat:   Lips, mucosa, and tongue normal; teeth and gums normal   Neck:   Supple, symmetrical,  no adenopathy;  thyroid:  normal   Back:     Symmetric, ROM normal, no CVA tenderness   Lungs:     Clear to auscultation bilaterally, respirations unlabored   CV:    Regular rate and rhythm   Abdomen:     Soft, non-tender, no masses, no organomegaly   Extremities:   Extremities normal, atraumatic, no cyanosis or edema "   Pulses:   2+ and symmetric all extremities   Skin:   Skin color, texture, turgor normal, no rashes or lesions   Neurologic:   normal strength and tone throughout       Diabetic foot exam: normal DP and PT pulses, no trophic changes or ulcerative lesions and normal sensory exam     Laboratory:        Results for orders placed or performed in visit on 03/26/21   Glycosylated Hemoglobin A1c   Result Value Ref Range    Hemoglobin A1c 9.0 (H) <=5.6 %   Comprehensive Metabolic Panel   Result Value Ref Range    Sodium 139 136 - 145 mmol/L    Potassium 5.4 (H) 3.5 - 5.0 mmol/L    Chloride 104 98 - 107 mmol/L    CO2 25 22 - 31 mmol/L    Anion Gap, Calculation 10 5 - 18 mmol/L    Glucose 135 (H) 70 - 125 mg/dL    BUN 29 (H) 8 - 22 mg/dL    Creatinine 1.00 0.70 - 1.30 mg/dL    GFR MDRD Af Amer >60 >60 mL/min/1.73m2    GFR MDRD Non Af Amer >60 >60 mL/min/1.73m2    Bilirubin, Total 0.6 0.0 - 1.0 mg/dL    Calcium 9.0 8.5 - 10.5 mg/dL    Protein, Total 6.8 6.0 - 8.0 g/dL    Albumin 4.5 3.5 - 5.0 g/dL    Alkaline Phosphatase 130 (H) 45 - 120 U/L    AST 13 0 - 40 U/L    ALT 22 0 - 45 U/L   Lipid [place] FASTING   Result Value Ref Range    Cholesterol 85 <=199 mg/dL    Triglycerides 89 <=149 mg/dL    HDL Cholesterol 34 (L) >=40 mg/dL    LDL Calculated 33 <=129 mg/dL    Patient Fasting > 8hrs? Yes    Microalbumin, Random Urine   Result Value Ref Range    Microalbumin, Random Urine 5.85 (H) 0.00 - 1.99 mg/dL    Creatinine, Urine 140.1 mg/dL    Microalbumin/Creatinine Ratio Random Urine 41.8 (H) <=19.9 mg/g

## 2021-06-16 NOTE — PATIENT INSTRUCTIONS - HE
I will get your paperwork faxed to your surgeon.    Follow your surgeon's directions regarding eating and drinking prior to surgery.     No lisinopril the morning of surgery.    Otherwise you can take your amlodipine the morning of your procedure with a small sip of water. It's ok to skip until the afternoon/evening too if that makes things simpler.      For your diabetic medications: hold metformin and glipizide the morning of.     Stop all supplements a week before surgery.    Your surgeon will manage your pain after your procedure.

## 2021-06-17 NOTE — TELEPHONE ENCOUNTER
Telephone Encounter by Tanna Vo RN at 10/1/2020  9:16 AM     Author: Tanna Vo RN Service: -- Author Type: Registered Nurse    Filed: 10/1/2020 10:03 AM Encounter Date: 9/30/2020 Status: Signed    : Tanna Vo RN (Registered Nurse)       FW: Question about medications  Received: Yesterday  Message Contents   Missael Wilkins (Jose), Tanna Dalton, RN   Phone Number: [phone]             We should check his AST and ALT and then switch him over from atorvastatin to Crestor 40 mg.          Orders placed for AST/ALT. Pt was called and he will make an appt with PMD office to get these labs drawn. New Rx for rosuvastatin placed- he is nearly out of atorvastatin. He will make the switch to 40 mg of rosuvastatin in place of atorvastatin. [facility] was notified of the switch[place] [place]

## 2021-06-18 ENCOUNTER — COMMUNICATION - HEALTHEAST (OUTPATIENT)
Dept: FAMILY MEDICINE | Facility: CLINIC | Age: 66
End: 2021-06-18

## 2021-06-18 DIAGNOSIS — E11.9 DIABETES (H): ICD-10-CM

## 2021-06-18 NOTE — PROGRESS NOTES
"Cardiology Progress Note    Assessment:  Coronary artery disease status post acute coronary syndrome(non-ST segment elevations anterior myocardial infarction) and stenting of severe mid LAD lesion in February 2014,  moderate distal left circumflex lesion, no angina  Diabetes mellitus    Obesity    Hypertension, good control  Hypercholesterolemia, good LDL control      Plan:  Continue current cardiac medications    I stressed importance of regular exercise and proper diet.    Follow-up in 1 year    Subjective:   This is 62 y.o. male who comes in today for follow-up visit.  He reports no chest pain or shortness of breath.  He continues to stay physically active.  He rides bicycle for about 3040 minutes every day.  He has never experienced any chest discomfort during those activities.  A few days ago he was blowing leaves from his roof.  That was on  one of hot days.  He felt forceful heart beating after he was was done working.  He denies heart palpitations or syncope.    Review of Systems:   General: WNL  Eyes: WNL  Ears/Nose/Throat: WNL  Lungs: WNL  Heart: WNL  Stomach: WNL  Bladder: WNL  Muscle/Joints: WNL  Skin: WNL  Nervous System: WNL  Mental Health: WNL     Blood: WNL    Objective:   /52 (Patient Site: Right Arm, Patient Position: Sitting, Cuff Size: Adult Large)  Pulse 72  Resp 16  Ht 5' 6\" (1.676 m)  Wt 192 lb (87.1 kg)  BMI 30.99 kg/m2  Physical Exam:  GENERAL: no distress  NECK: No JVD  LUNGS: Clear to auscultation.  CARDIAC: regular rhythm, S1 & S2 normal.  No heaves, thrills, gallops or murmurs.  ABDOMEN: flat, negative hepatosplenomegaly, soft and non-tender.  EXTREMITIES: No evidence of cyanosis, clubbing or edema.    Current Outpatient Prescriptions   Medication Sig Dispense Refill     amLODIPine (NORVASC) 5 MG tablet TAKE ONE TABLET BY MOUTH TWICE DAILY 180 tablet 1     aspirin 81 MG EC tablet Take 81 mg by mouth daily.       atorvastatin (LIPITOR) 80 MG tablet TAKE ONE TABLET BY MOUTH ONCE " DAILY 90 tablet 2     blood sugar diagnostic (GLUCOSE BLOOD) Strp 3 (three) times a day. OneTouch Ultra Blue In Vitro Strip       glipiZIDE (GLUCOTROL) 10 MG tablet Take 1 tablet (10 mg total) by mouth 2 (two) times a day. 180 tablet 0     glucosamine HCl/chondroitin rothman (GLUCOSAMINE-CHONDROITIN ORAL) Take 1 tablet by mouth 2 (two) times a day.       lisinopril (PRINIVIL,ZESTRIL) 20 MG tablet TAKE 1 TABLET(20 MG) BY MOUTH TWICE DAILY 180 tablet 1     metFORMIN (GLUCOPHAGE) 1000 MG tablet Take 1 tablet (1,000 mg total) by mouth 2 (two) times a day with meals. 180 tablet 0     nitroglycerin (NITRO-BID) 2 % ointment Place 0.5 inches on the skin 2 (two) times a day. To left ear area       ONE TOUCH ULTRASOFT LANCETS MISC 3 (three) times a day. Use to check sugar       atorvastatin (LIPITOR) 80 MG tablet TAKE ONE TABLET BY MOUTH ONCE DAILY 90 tablet 2     glipiZIDE (GLUCOTROL) 10 MG tablet TAKE ONE TABLET BY MOUTH TWICE DAILY BEFORE MEALS. 60 tablet 0     metFORMIN (GLUCOPHAGE) 1000 MG tablet TAKE 1 TABLET BY MOUTH TWICE DAILY WITH MEALS 60 tablet 0     No current facility-administered medications for this visit.        Cardiographics:    Echocardiogram: February 2015   Mild eccentric left ventricular hypertrophy is present.  Left ventricular ejection fraction is visually estimated to be 65%.  No regional wall motion abnormalities.  Nodular thickening of the left coronary leaflet with normal valve  excursion.  Mild, moderate aortic regurgitation is noted.      Stress Test: November 2016   Lexiscan stress nuclear study is negative for inducible myocardial   ischemia or infarction.       Coronary angio: February 2015   Ant STEMI  95% thrombotic lesion in mid LAD, treated successfully with  2.75x16 Promus BYRON  0% residual, Indigo 3 flow pre and post  Moderate disease in distal Cx, and mild disease in RCA  LVEF 65%  EDP 17mmHg    Lab Results:       Lab Results   Component Value Date    CHOL 94 03/21/2018    CHOL 88 06/07/2017     CHOL 100 11/11/2016     Lab Results   Component Value Date    HDL 33 (L) 03/21/2018    HDL 32 (L) 06/07/2017    HDL 33 (L) 11/11/2016     Lab Results   Component Value Date    LDLCALC 43 03/21/2018    LDLCALC 42 06/07/2017    LDLCALC 41 11/11/2016     Lab Results   Component Value Date    TRIG 89 03/21/2018    TRIG 68 06/07/2017    TRIG 130 11/11/2016     No components found for: CHOLHDL  BNP   Date Value Ref Range Status   02/16/2015 83 (H) 0 - 48 pg/mL Final       Missael (Jose)  MD Franky

## 2021-06-19 NOTE — PROGRESS NOTES
Assessment & Plan:      1. Routine adult health maintenance    2. Controlled type 2 diabetes mellitus without complication, without long-term current use of insulin (H)  - Glycosylated Hemoglobin A1c    3. Benign Essential Hypertension     We reviewed dietary recommendations, including low salt and high fiber diet, and  recommendations for regular exercise/activity. We discussed limiting saturated and trans fats in his diet and using more of the good fats such as olive oil, canola oil, flax seed and peanut oil, etc    - Comprehensive Metabolic Panel    4. Coronary artery disease due to lipid rich plaque  - Comprehensive Metabolic Panel    5. Screening for prostate cancer  - PSA, Annual Screen (Prostatic-Specific Antigen)     Patient Counseling:   --Nutrition: Stressed importance of moderation in sodium/caffeine intake, saturated fat and cholesterol, caloric balance, sufficient intake of fresh fruits, vegetables, fiber, calcium, and iron intake  --Exercise: Stressed the importance of regular exercise.   --Substance Abuse: Discussed cessation/primary prevention of tobacco, alcohol, or other drug use; driving or other dangerous activities under the influence; availability of treatment for abuse.   --Injury prevention: Discussed safety belts, safety helmets, smoke detector, smoking near bedding or upholstery.   --Dental health: Discussed importance of regular tooth brushing, flossing, and dental  visits.   --We reviewed self testicular exams, guidelines for prostate cancer and colon cancer screening   --Immunizations reviewed.   --Advances Directives were reviewed and he was given a copy of the Honoring Choices Document.    Discussed the patient's BMI with him.  The BMI is in the acceptable range     I have had an Advance Directives discussion with the patient.  The following high BMI interventions were performed this visit: encouragement to exercise and lifestyle education regarding diet    Subjective:        Vinicio  "SHIVANI Guaman is a 62 y.o. male who presents for an annual exam.   The patient reports no concerns.    Fasting today? Yes  Diabetes      Patient also presents for follow-up of Type 2 diabetes mellitus.  Current symptoms/problems include paresthesia of the feet, although he does have some lumbar disc disease as well.  Patient denies hypoglycemia , nausea and vomiting. Home sugars: BGs consistently in an acceptable range. Current monitoring regimen: home blood tests - 3-4 times weekly. Any episodes of hypoglycemia? no.  Last dilated eye exam:  unsure.     Known diabetic complications: none. Current diabetic medications include oral agents (dual therapy): glipizide (generic), metformin (generic). Weight trend: stable. Prior visit with dietician: yes . Current diet: in general, a \"healthy\" diet  . Current exercise: no regular exercise and walking.     Is He on ACE inhibitor or angiotensin II receptor blocker? Yes lisinopril (generic)      He has had some numbness in feet, L>R - has a history of back pain and lumbar disc disease. Started after using a ladder to paint. S/P physical therapy without much change.      Has the patient ever been transfused or tattooed?: no.     Do you have pain that bothers you in your daily life? no     The patient reports that there is not domestic violence in his life. unsure      Fasting today?  Yes    Healthy Habits:   Regular Exercise: No  Sunscreen Use: No  Healthy Diet: Yes  Dental Visits Regularly: Yes  Seat Belt: Yes  Sexually active: No  Monthly Self Testicular Exams:  Yes  Colonoscopy: Yes  Lipid Profile: Yes  Glucose Screen: Yes  Prevention of Osteoporosis: Yes  Last Dexa: No  Guns at Home:  No    Immunization History   Administered Date(s) Administered     DT (pediatric) 06/13/2005     Hep A, historic 09/08/2006, 03/27/2013     Pneumo Polysac 23-V 03/26/2010     Td,adult,historic,unspecified 06/04/2007     Tdap 06/04/2007, 07/25/2017     Immunization status: up to date and " "documented.     The following portions of the patient's history were reviewed and updated as appropriate: allergies, current medications, past family history, past medical history, past social history, past surgical history and problem list.     Review of Systems  A 12 point comprehensive review of systems was negative except as noted.   On amox for otitis media since 7/17  Healthy diet, balanced  Nocturia x 2, no hesitancy, intermittency      Objective:     Vitals:    08/02/18 0757   BP: 128/62   Pulse: (!) 56   Temp: 97.6  F (36.4  C)   TempSrc: Oral   Weight: 194 lb 2 oz (88.1 kg)   Height: 5' 6\" (1.676 m)      Body mass index is 31.33 kg/(m^2).   Physical Exam  General: Alert, cooperative, no distress  Head: Normocephalic, without obvious abnormality, atraumatic  Eyes: PERRL, conjunctiva/corneas clear, EOM's intact  Ears: Normal TM's and external ear canals, both ears  Nose: Nares normal, septum midline,mucosa normal, no drainage  Throat: Lips, mucosa, and tongue normal; teeth and gums normal  Neck: Supple, symmetrical, trachea midline, no adenopathy;  thyroid normal  Back: Symmetric, no curvature, ROM normal, no CVA tenderness  Lungs: Clear to auscultation bilaterally, respirations unlabored  Heart: Regular rate and rhythm, no murmur, rub, or gallop,  Abdomen: Soft, non-tender, no masses, no organomegaly  Genitourinary: exam deferred   Musculoskeletal: Normal range of motion. No joint swelling or deformity.   Extremities: Extremities normal, atraumatic, no cyanosis or edema  Skin: Skin color, texture, turgor normal, no rashes or lesions  Lymph nodes: Cervical, supraclavicular, and axillary nodes normal  Neurologic: Grossly normal   Psychiatric: Normal mood and affect.             Results for orders placed or performed in visit on 08/02/18   Glycosylated Hemoglobin A1c   Result Value Ref Range    Hemoglobin A1c 6.9 (H) 3.5 - 6.0 %   Comprehensive Metabolic Panel   Result Value Ref Range    Sodium 139 136 - 145 " mmol/L    Potassium 6.0 (H) 3.5 - 5.0 mmol/L    Chloride 104 98 - 107 mmol/L    CO2 26 22 - 31 mmol/L    Anion Gap, Calculation 9 5 - 18 mmol/L    Glucose 141 (H) 70 - 125 mg/dL    BUN 20 8 - 22 mg/dL    Creatinine 0.80 0.70 - 1.30 mg/dL    GFR MDRD Af Amer >60 >60 mL/min/1.73m2    GFR MDRD Non Af Amer >60 >60 mL/min/1.73m2    Bilirubin, Total 0.7 0.0 - 1.0 mg/dL    Calcium 9.6 8.5 - 10.5 mg/dL    Protein, Total 6.8 6.0 - 8.0 g/dL    Albumin 4.3 3.5 - 5.0 g/dL    Alkaline Phosphatase 103 45 - 120 U/L    AST 17 0 - 40 U/L    ALT 32 0 - 45 U/L   PSA, Annual Screen (Prostatic-Specific Antigen)   Result Value Ref Range    PSA 0.8 0.0 - 4.5 ng/mL   Microalbumin, Random Urine   Result Value Ref Range    Microalbumin, Random Urine 0.63 0.00 - 1.99 mg/dL    Creatinine, Urine 113.9 mg/dL    Microalbumin/Creatinine Ratio Random Urine 5.5 <=19.9 mg/g

## 2021-06-19 NOTE — LETTER
Letter by Cynthia Ellsworth MD at      Author: Cynthia Ellsworth MD Service: -- Author Type: --    Filed:  Encounter Date: 6/28/2019 Status: (Other)         Vinicio PARRISH Rowena  [address] [place] [address]             June 28, 2019         Dear Mr. Guaman,    Below are the results from your recent visit:    Resulted Orders   Lipid Cascade   Result Value Ref Range    Cholesterol 98 <=199 mg/dL    Triglycerides 98 <=149 mg/dL    HDL Cholesterol 35 (L) >=40 mg/dL    LDL Calculated 43 <=129 mg/dL    Patient Fasting > 8hrs? Yes    Glycosylated Hemoglobin A1c   Result Value Ref Range    Hemoglobin A1c 7.4 (H) 3.5 - 6.0 %   Comprehensive Metabolic Panel   Result Value Ref Range    Sodium 138 136 - 145 mmol/L    Potassium 5.1 (H) 3.5 - 5.0 mmol/L    Chloride 106 98 - 107 mmol/L    CO2 22 22 - 31 mmol/L    Anion Gap, Calculation 10 5 - 18 mmol/L    Glucose 148 (H) 70 - 125 mg/dL    BUN 23 (H) 8 - 22 mg/dL    Creatinine 0.82 0.70 - 1.30 mg/dL    GFR MDRD Af Amer >60 >60 mL/min/1.73m2    GFR MDRD Non Af Amer >60 >60 mL/min/1.73m2    Bilirubin, Total 0.6 0.0 - 1.0 mg/dL    Calcium 9.7 8.5 - 10.5 mg/dL    Protein, Total 6.7 6.0 - 8.0 g/dL    Albumin 4.5 3.5 - 5.0 g/dL    Alkaline Phosphatase 127 (H) 45 - 120 U/L    AST 18 0 - 40 U/L    ALT 34 0 - 45 U/L    Narrative    Fasting Glucose reference range is 70-99 mg/dL per  American Diabetes Association (ADA) guidelines.       Labs show diabetes is under adequate control, but slightly worse than previous. Your A1c should ideally be <7.0. Recommend work on high fiber, low simple carbohydrate diet and regular exercise.  Kidney function is normal. Lipids are under excellent control.     Continue your same medications, dietary measures and regular exercise as directed. Recheck fasting labs in 3-4 mos.       Please call with questions or contact us using [facility].    Sincerely,    Electronically signed by Cynthia Ellsworth MD

## 2021-06-19 NOTE — LETTER
Letter by Missael Wilkins MD (Ted) at      Author: Missael Wilkins MD (Ted) Service: -- Author Type: --    Filed:  Encounter Date: 4/15/2019 Status: (Other)         Vinicio Guaman  [address] [place] [address]      April 15, 2019      Dear Vinicio,    This letter is to remind you that you will be due for your follow up appointment with Dr. Jose Wilkins  . To help ensure you are in the best health possible, a regular follow-up with your cardiologist is essential.     Please call our Patient Scheduling Line at [phone] to schedule your appointment at your earliest convenience.  If you have recently scheduled an appointment, please disregard this letter.    We look forward to seeing you again. As always, we are available at the number  above for any questions or concerns you may have.      Sincerely,     The Physicians and Staff of [facility]

## 2021-06-26 NOTE — TELEPHONE ENCOUNTER
Refill Approved    Rx renewed per Medication Renewal Policy. Medication was last renewed on 9/20/20.    Huber Zhu, [facility] Triage/Med Refill 6/18/2021     Requested Prescriptions   Pending Prescriptions Disp Refills     metFORMIN (GLUCOPHAGE) 1000 MG tablet 180 tablet 2     Sig: TAKE 1 TABLET(1000 MG) BY MOUTH TWICE DAILY WITH MEALS       Metformin Refill Protocol Passed - 6/18/2021  8:44 AM        Passed - Blood pressure in last 12 months     BP Readings from Last 1 Encounters:   04/15/21 128/60             Passed - LFT or AST or ALT in last 12 months     Albumin   Date Value Ref Range Status   03/26/2021 4.5 3.5 - 5.0 g/dL Final     Bilirubin, Total   Date Value Ref Range Status   03/26/2021 0.6 0.0 - 1.0 mg/dL Final     Bilirubin, Direct   Date Value Ref Range Status   08/11/2015 0.3 <=0.5 mg/dL Final     Alkaline Phosphatase   Date Value Ref Range Status   03/26/2021 130 (H) 45 - 120 U/L Final     AST   Date Value Ref Range Status   03/26/2021 13 0 - 40 U/L Final     ALT   Date Value Ref Range Status   03/26/2021 22 0 - 45 U/L Final     Protein, Total   Date Value Ref Range Status   03/26/2021 6.8 6.0 - 8.0 g/dL Final                Passed - GFR or Serum Creatinine in last 6 months     GFR MDRD Non Af Amer   Date Value Ref Range Status   03/26/2021 >60 >60 mL/min/1.73m2 Final     GFR MDRD Af Amer   Date Value Ref Range Status   03/26/2021 >60 >60 mL/min/1.73m2 Final             Passed - Visit with PCP or prescribing provider visit in last 6 months or next 3 months     Last office visit with prescriber/PCP: 3/26/2021 OR same dept: 3/26/2021 Cynthia Ellsworth MD OR same specialty: 3/26/2021 Cynthia Ellsworth MD Last physical: Visit date not found Last MTM visit: Visit date not found         Next appt within 3 mo: Visit date not found  Next physical within 3 mo: Visit date not found  Prescriber OR PCP: Cynthia Ellsworth MD  Last diagnosis associated with med order: 1. Diabetes (H)  -  metFORMIN (GLUCOPHAGE) 1000 MG tablet; TAKE 1 TABLET(1000 MG) BY MOUTH TWICE DAILY WITH MEALS  Dispense: 180 tablet; Refill: 2     If protocol passes may refill for 12 months if within 3 months of last provider visit (or a total of 15 months).           Passed - A1C in last 6 months     Hemoglobin A1c   Date Value Ref Range Status   03/26/2021 9.0 (H) <=5.6 % Final               Passed - Microalbumin in last year      Microalbumin, Random Urine   Date Value Ref Range Status   03/26/2021 5.85 (H) 0.00 - 1.99 mg/dL Final

## 2021-06-30 NOTE — PROGRESS NOTES
"Progress Notes by Missael Wilkins MD (Ted) at 3/1/2021  1:30 PM     Author: Missael Wilkins MD (Ted) Service: -- Author Type: Physician    Filed: 3/1/2021  2:12 PM Encounter Date: 3/1/2021 Status: Signed    : Missael Wilkins MD (Ted) (Physician)           Cardiology Progress Note    Assessment:  Coronary artery disease status post acute coronary syndrome(non-ST segment elevations anterior myocardial infarction) and stenting of severe mid LAD lesion in February 2014,  moderate distal left circumflex lesion, no angina  Aortic stenosis, mild/aortic regurgitation mild to moderate  Diabetes mellitus    Obesity    Hypertension, probably good control  Hypercholesterolemia, excellent LDL control  Lower extremity edema likely due to high-dose amlodipine      Plan:  I asked him to check blood pressure at home and call me if systolic blood pressures higher than 140    We discussed issues related to aortic stenosis and regurgitation.  I do not anticipate he will need aortic valve replacement in the near future.  We will need to keep blood pressure under good control to prevent worsening of aortic regurgitation.    Echo to reassess aortic valve in 1 year    Follow-up in 1 year    Subjective:   This is 65 y.o. male who comes in today follow-up visit.  He reports no chest pains or increasing shortness of breath.  He is fairly physically active.  Earlier in the fall 2020 he underwent surgery for entrapment neuropathy.  His walking ability has improved tremendously after the surgery.    Review of Systems:   General: WNL  Eyes: WNL  Ears/Nose/Throat: WNL  Lungs: WNL  Heart: WNL  Stomach: WNL  Bladder: WNL  Muscle/Joints: WNL  Skin: WNL  Nervous System: WNL  Mental Health: WNL     Blood: WNL    Objective:   /72 (Patient Site: Left Arm, Patient Position: Sitting, Cuff Size: Adult Large)   Pulse 72   Resp 16   Ht 5' 6\" (1.676 m)   Wt 194 lb (88 kg)   BMI 31.31 kg/m    Physical " Exam:  GENERAL: no distress  NECK: No JVD  LUNGS: Clear to auscultation.  CARDIAC: regular rhythm, S1 & S2 normal.  No heaves, thrills, gallops soft ejection murmur at the aortic area.  No diastolic murmur  ABDOMEN: flat, negative hepatosplenomegaly, soft and non-tender.  EXTREMITIES: No evidence of cyanosis, clubbing 1+ edema.    Current Outpatient Medications   Medication Sig Dispense Refill   ? amLODIPine (NORVASC) 5 MG tablet Take 1 tablet (5 mg total) by mouth 2 (two) times a day. 180 tablet 0   ? aspirin 81 MG EC tablet Take 81 mg by mouth daily.     ? glipiZIDE (GLUCOTROL) 10 MG tablet TAKE 1 TABLET(10 MG) BY MOUTH TWICE DAILY 180 tablet 3   ? lisinopriL (PRINIVIL,ZESTRIL) 20 MG tablet TAKE 1 TABLET(20 MG) BY MOUTH TWICE DAILY 180 tablet 1   ? metFORMIN (GLUCOPHAGE) 1000 MG tablet TAKE 1 TABLET(1000 MG) BY MOUTH TWICE DAILY WITH MEALS 180 tablet 2   ? rosuvastatin (CRESTOR) 40 MG tablet Take 1 tablet (40 mg total) by mouth at bedtime. 90 tablet 0   ? ibuprofen (ADVIL,MOTRIN) 600 MG tablet Take 1 tablet (600 mg total) by mouth every 6 (six) hours as needed for pain. 60 tablet 0   ? methocarbamoL (ROBAXIN) 750 MG tablet Take 1 tablet (750 mg total) by mouth 3 (three) times a day as needed (muscle spasms). 45 tablet 0   ? nitroglycerin (NITRO-BID) 2 % ointment Place 0.5 inches on the skin 2 (two) times a day. To left ear area (to help get blood flow to the area)       No current facility-administered medications for this visit.        Cardiographics:    Echocardiogram: November 2020    Normal left ventricular size and systolic function.The calculated left ventricular ejection fraction is 74%. This represents a normal ejection fraction. Mild concentric hypertrophy noted. E/e' 8 to 15, which is equivocal for estimating LV filling pressures.Left ventricular diastolic function is normal.    The left ventricular wall motion is normal.    Right Ventricle: Normal right ventricular size and systolic function.    Mild  aortic stenosis. Moderate aortic regurgitation.    Mild tricuspid valve regurgitation. No pulmonary hypertension present. The estimated systolic pulmonary artery pressure is 29 mmhg.    When compared to the previous study dated 2/16/2015, there is now mild aortic stenosis and moderate aortic regurgitation.      Stress Test: November 2016   Lexiscan stress nuclear study is negative for inducible myocardial   ischemia or infarction.       Coronary angio: February 2015   Ant STEMI  95% thrombotic lesion in mid LAD, treated successfully with  2.75x16 Promus BYRON  0% residual, Indigo 3 flow pre and post  Moderate disease in distal Cx, and mild disease in RCA  LVEF 65%  EDP 17mmHg    Lab Results:       Lab Results   Component Value Date    CHOL 103 09/18/2020    CHOL 114 12/19/2019    CHOL 98 06/25/2019     Lab Results   Component Value Date    HDL 39 (L) 09/18/2020    HDL 46 12/19/2019    HDL 35 (L) 06/25/2019     Lab Results   Component Value Date    LDLCALC 36 09/18/2020    LDLCALC 48 12/19/2019    LDLCALC 43 06/25/2019     Lab Results   Component Value Date    TRIG 139 09/18/2020    TRIG 99 12/19/2019    TRIG 98 06/25/2019     BNP   Date Value Ref Range Status   02/16/2015 83 (H) 0 - 48 pg/mL Final       Missael (Jose)  MD Franky

## 2021-07-03 NOTE — ADDENDUM NOTE
Addendum Note by Coleen Faulkner, RN at 1/19/2021 12:48 PM     Author: Coleen Faulkner RN Service: -- Author Type: Registered Nurse    Filed: 1/19/2021 12:48 PM Encounter Date: 1/19/2021 Status: Signed    : Coleen Faulkner RN (Registered Nurse)    Addended by: COLEEN FAULKNER on: 1/19/2021 12:48 PM        Modules accepted: Orders

## 2021-07-03 NOTE — ANESTHESIA PREPROCEDURE EVALUATION
Anesthesia Preprocedure Evaluation by Nate Monahan MD at 11/12/2020 11:11 AM     Author: Nate Monahan MD Service: -- Author Type: Physician    Filed: 11/12/2020 11:16 AM Date of Service: 11/12/2020 11:11 AM Status: Addendum    : Nate Monahan MD (Physician)    Related Notes: Original Note by Nate Monahan MD (Physician) filed at 11/12/2020 11:16 AM       Anesthesia Evaluation      Patient summary reviewed   No history of anesthetic complications     Airway   Mallampati: III  Neck ROM: full   Pulmonary - negative ROS and normal exam   (+) a smoker (former - quit 2015)                         Cardiovascular - normal exam  Exercise tolerance: > or = 4 METS  (+) hypertension, past MI (NSTEMI 2015), CAD, CABG/stent (s/p stent 2015), , hypercholesterolemia,      Neuro/Psych    (+) neuromuscular disease (cervical spondylosis and cord compression),      Endo/Other    (+) diabetes mellitus type 2, obesity (bmi 30),      GI/Hepatic/Renal - negative ROS      Other findings: Presents with neuro symptoms necessitating urgent decompression of cervical and thoracic spine      Dental    (+) caps                           Anesthesia Plan  Planned anesthetic: general endotracheal  Versed/fentanyl/propofol  Ketamine PRN  Decadron/zofran  neuromonitoring planned - will do precedex and propofol infusions    glidescope intubation with neutral head/neck position      ASA 3   Induction: intravenous   Anesthetic plan and risks discussed with: patient and spouse  Anesthesia plan special considerations: video-assisted, antiemetics, arterial catheterization, IV therapy two IVs,   Post-op plan: routine recovery      11/11/2020 covid pcr negative    Glucose 153 this AM      Chemistry        Component Value Date/Time     11/11/2020 1508    K 4.3 11/11/2020 1508     11/11/2020 1508    CO2 26 11/11/2020 1508    BUN 20 11/11/2020 1508    CREATININE 1.09 11/11/2020 1508     (H) 11/11/2020 1508        Component Value  Date/Time    CALCIUM 9.2 11/11/2020 1508    ALKPHOS 114 09/18/2020 0857    AST 14 10/02/2020 1409    ALT 35 10/02/2020 1409    BILITOT 0.6 09/18/2020 0857        Lab Results   Component Value Date    WBC 5.3 11/11/2020    HGB 11.8 (L) 11/11/2020    HCT 33.8 (L) 11/11/2020    MCV 91 11/11/2020     11/11/2020     Lab Results   Component Value Date    INR 1.02 11/11/2020    INR 0.97 11/11/2020    INR 0.97 02/16/2015 11/12/2020 Echo:  Summary      Normal left ventricular size and systolic function.The calculated left ventricular ejection fraction is 74%. This represents a normal ejection fraction. Mild concentric hypertrophy noted. E/e' 8 to 15, which is equivocal for estimating LV filling pressures.Left ventricular diastolic function is normal.    The left ventricular wall motion is normal.    Right Ventricle: Normal right ventricular size and systolic function.    Mild aortic stenosis. Moderate aortic regurgitation.    Mild tricuspid valve regurgitation. No pulmonary hypertension present. The estimated systolic pulmonary artery pressure is 29 mmhg.    When compared to the previous study dated 2/16/2015, there is now mild aortic stenosis and moderate aortic regurgitation.

## 2021-07-03 NOTE — ADDENDUM NOTE
Addendum Note by Marleny Logan MD at 6/24/2019  5:10 PM     Author: Marleny Logan MD Service: -- Author Type: Physician    Filed: 6/24/2019  5:10 PM Encounter Date: 6/23/2019 Status: Signed    : Marleny Logan MD (Physician)    Addended by: MARLENY LOGAN on: 6/24/2019 05:10 PM        Modules accepted: Orders

## 2021-07-10 ENCOUNTER — HEALTH MAINTENANCE LETTER (OUTPATIENT)
Age: 66
End: 2021-07-10

## 2021-07-13 ENCOUNTER — OFFICE VISIT (OUTPATIENT)
Dept: FAMILY MEDICINE | Facility: CLINIC | Age: 66
End: 2021-07-13
Payer: COMMERCIAL

## 2021-07-13 VITALS
SYSTOLIC BLOOD PRESSURE: 138 MMHG | HEIGHT: 64 IN | BODY MASS INDEX: 32.31 KG/M2 | OXYGEN SATURATION: 98 % | HEART RATE: 78 BPM | WEIGHT: 189.25 LBS | DIASTOLIC BLOOD PRESSURE: 58 MMHG

## 2021-07-13 DIAGNOSIS — E78.2 MIXED HYPERLIPIDEMIA: ICD-10-CM

## 2021-07-13 DIAGNOSIS — E11.9 CONTROLLED TYPE 2 DIABETES MELLITUS WITHOUT COMPLICATION, WITHOUT LONG-TERM CURRENT USE OF INSULIN (H): ICD-10-CM

## 2021-07-13 DIAGNOSIS — Z00.00 ROUTINE ADULT HEALTH MAINTENANCE: Primary | ICD-10-CM

## 2021-07-13 DIAGNOSIS — I10 ESSENTIAL HYPERTENSION, BENIGN: ICD-10-CM

## 2021-07-13 DIAGNOSIS — I25.10 ATHEROSCLEROSIS OF NATIVE CORONARY ARTERY OF NATIVE HEART WITHOUT ANGINA PECTORIS: ICD-10-CM

## 2021-07-13 PROCEDURE — 36415 COLL VENOUS BLD VENIPUNCTURE: CPT | Performed by: FAMILY MEDICINE

## 2021-07-13 PROCEDURE — 80053 COMPREHEN METABOLIC PANEL: CPT | Performed by: FAMILY MEDICINE

## 2021-07-13 PROCEDURE — 80061 LIPID PANEL: CPT | Performed by: FAMILY MEDICINE

## 2021-07-13 PROCEDURE — 99396 PREV VISIT EST AGE 40-64: CPT | Performed by: FAMILY MEDICINE

## 2021-07-13 RX ORDER — LISINOPRIL 20 MG/1
TABLET ORAL
Qty: 180 TABLET | Refills: 3 | Status: SHIPPED | OUTPATIENT
Start: 2021-07-13 | End: 2022-07-08

## 2021-07-13 ASSESSMENT — MIFFLIN-ST. JEOR: SCORE: 1568.4

## 2021-07-13 NOTE — PROGRESS NOTES
SUBJECTIVE:   CC: Vinicio Guaman is an 65 year old male who presents for preventative health visit.     Patient has been advised of split billing requirements and indicates understanding: Yes  Healthy Habits:     Getting at least 3 servings of Calcium per day:  Yes    Bi-annual eye exam:  Yes    Dental care twice a year:  Yes    Sleep apnea or symptoms of sleep apnea:  None    Diet:  Low salt    Frequency of exercise:  2-3 days/week    Duration of exercise:  15-30 minutes    Taking medications regularly:  Yes    Medication side effects:  None    PHQ-2 Total Score: 1    Additional concerns today:  Yes           Vinicio Guaman is a 65 year old male who presents for preventive visit and follow up of diabetes, hypertension, hyperlipidemia and CAD. Compliance with treatment has been good, and compliance with diet has been good. Current symptoms/problems include none. Patient denies hypoglycemia , nausea, paresthesia of the feet and visual disturbances. Home sugars: BGs consistently in an acceptable range. Current monitoring regimen: home blood tests - once daily. Any episodes of hypoglycemia? No. Weight trend: stable. Last dilated eye exam 8/2020.     Current diabetes treatment:   Continued sulfonylurea which has been effective and Continued metformin which has been effective. Current side effects: none. Prior visit with dietician: yes    Current diet: fairly healthy diet.  Current exercise: walking, yard work. Is he on ACE inhibitor or angiotensin II receptor blocker? yes. Lisinopril.       As far as his hypertension and dyslipidemia, a repeat fasting lipid profile was done. Compliance with treatment has been good. Patient denies muscle pain associated with his medications. Associated signs and symptoms: none. Denies chest pain, dyspnea, palpitations, peripheral edema and tiredness/fatigue. The patient exercises intermittently.        He is currently taking amlodipine, lisinopril and crestor. Current side effects  "include: none  Previous history of cardiac disease includes: MI - NSTMI and PTCA and stenting of mid LAD , Mild aortic stenosis with mild-mod regurgitation.      Today's PHQ-2 Score:   PHQ-2 (  [employer]) 2021   Q1: Little interest or pleasure in doing things 1   Q2: Feeling down, depressed or hopeless 0   PHQ-2 Score 1   Q1: Little interest or pleasure in doing things Several days   Q2: Feeling down, depressed or hopeless Not at all   PHQ-2 Score 1       Abuse: Current or Past(Physical, Sexual or Emotional)- No  Do you feel safe in your environment? Yes        Social History     Tobacco Use     Smoking status: Former Smoker     Quit date: 2015     Years since quittin.4     Smokeless tobacco: Never Used   Substance Use Topics     Alcohol use: Not Currently       Alcohol Use 2021   Prescreen: >3 drinks/day or >7 drinks/week? No     Last PSA:   Prostate Specific Antigen Screen   Date Value Ref Range Status   2019 0.6 0.00 - 4.50 ng/mL Final       Reviewed orders with patient. Reviewed health maintenance and updated orders accordingly - Yes  Lab work is in process    Reviewed and updated as needed this visit by clinical staff  Tobacco  Allergies  Meds   Med Hx  Surg Hx  Fam Hx  Soc Hx        Reviewed and updated as needed this visit by Provider                  Review of Systems   12 point ROS negative except as noted above       OBJECTIVE:   /58 (Patient Position: Sitting, Cuff Size: Adult Regular)   Pulse 78   Ht 1.632 m (5' 4.25\")   Wt 85.8 kg (189 lb 4 oz)   SpO2 98%   BMI 32.23 kg/m      Physical Exam  General: Alert, cooperative, no distress  Head: Normocephalic, without obvious abnormality, atraumatic  Eyes: PERRL, conjunctiva/corneas clear, EOM's intact  Ears: Normal TM's and external ear canals, both ears  Nose: Nares normal, septum midline,mucosa normal, no drainage  Throat: Lips, mucosa, and tongue normal; teeth and gums normal  Neck: Supple, symmetrical, " "trachea midline, no adenopathy;  thyroid normal  Back: Symmetric, no curvature, ROM normal, no CVA tenderness  Lungs: Clear to auscultation bilaterally, respirations unlabored  Heart: Regular rate and rhythm, no murmur, rub, or gallop,  Abdomen: Soft, non-tender, no masses, no organomegaly  Genitourinary: exam deferred   Musculoskeletal: Normal range of motion. No joint swelling or deformity.   Extremities: Extremities normal, atraumatic, no cyanosis or edema  Skin: Skin color, texture, turgor normal, no rashes or lesions  Lymph nodes: Cervical, supraclavicular, and axillary nodes normal  Neurologic: Grossly normal   Psychiatric: Normal mood and affect.          ASSESSMENT/PLAN:     Vinicio was seen today for annual visit.    Diagnoses and all orders for this visit:    Routine adult health maintenance    Controlled type 2 diabetes mellitus without complication, without long-term current use of insulin (H)  -     A1C; Future    Benign Essential Hypertension  -     Comprehensive metabolic panel (BMP + Alb, Alk Phos, ALT, AST, Total. Bili, TP); Future  -     lisinopril (ZESTRIL) 20 MG tablet; TAKE 1 TABLET(20 MG) BY MOUTH TWICE DAILY    Coronary atherosclerosis    Mixed hyperlipidemia  -     Comprehensive metabolic panel (BMP + Alb, Alk Phos, ALT, AST, Total. Bili, TP); Future  -     Lipid panel reflex to direct LDL Fasting; Future    Other orders  -     REVIEW OF HEALTH MAINTENANCE PROTOCOL ORDERS        Patient has been advised of split billing requirements and indicates understanding: Yes  COUNSELING:   Reviewed preventive health counseling, as reflected in patient instructions  Special attention given to:        Regular exercise       Healthy diet/nutrition       Vision screening       Aspirin prophylaxis        Colon cancer screening       Prostate cancer screening    Estimated body mass index is 32.23 kg/m  as calculated from the following:    Height as of this encounter: 1.632 m (5' 4.25\").    Weight as of this " encounter: 85.8 kg (189 lb 4 oz).     Weight management plan: Discussed healthy diet and exercise guidelines    He reports that he quit smoking about 6 years ago. He has never used smokeless tobacco.      Counseling Resources:  ATP IV Guidelines  Pooled Cohorts Equation Calculator  FRAX Risk Assessment  ICSI Preventive Guidelines  Dietary Guidelines for Americans, 2010  USDA's MyPlate  ASA Prophylaxis  Lung CA Screening    Cynthia Ellsworth MD  [facility] [place]

## 2021-07-14 LAB
ALBUMIN SERPL-MCNC: 4.9 G/DL (ref 3.5–5)
ALP SERPL-CCNC: 110 U/L (ref 45–120)
ALT SERPL W P-5'-P-CCNC: 25 U/L (ref 0–45)
ANION GAP SERPL CALCULATED.3IONS-SCNC: 15 MMOL/L (ref 5–18)
AST SERPL W P-5'-P-CCNC: 17 U/L (ref 0–40)
BILIRUB SERPL-MCNC: 0.7 MG/DL (ref 0–1)
BUN SERPL-MCNC: 27 MG/DL (ref 8–22)
CALCIUM SERPL-MCNC: 10.1 MG/DL (ref 8.5–10.5)
CHLORIDE BLD-SCNC: 104 MMOL/L (ref 98–107)
CHOLEST SERPL-MCNC: 112 MG/DL
CO2 SERPL-SCNC: 21 MMOL/L (ref 22–31)
CREAT SERPL-MCNC: 1.14 MG/DL (ref 0.7–1.3)
FASTING STATUS PATIENT QL REPORTED: YES
GFR SERPL CREATININE-BSD FRML MDRD: 68 ML/MIN/1.73M2
GLUCOSE BLD-MCNC: 87 MG/DL (ref 70–125)
HDLC SERPL-MCNC: 48 MG/DL
LDLC SERPL CALC-MCNC: 36 MG/DL
POTASSIUM BLD-SCNC: 4.8 MMOL/L (ref 3.5–5)
PROT SERPL-MCNC: 7.4 G/DL (ref 6–8)
SODIUM SERPL-SCNC: 140 MMOL/L (ref 136–145)
TRIGL SERPL-MCNC: 140 MG/DL

## 2021-09-04 ENCOUNTER — HEALTH MAINTENANCE LETTER (OUTPATIENT)
Age: 66
End: 2021-09-04

## 2021-09-24 DIAGNOSIS — E11.9 DIABETES (H): ICD-10-CM

## 2021-09-24 DIAGNOSIS — E11.9 CONTROLLED TYPE 2 DIABETES MELLITUS WITHOUT COMPLICATION, WITHOUT LONG-TERM CURRENT USE OF INSULIN (H): Primary | ICD-10-CM

## 2021-09-25 RX ORDER — GLIPIZIDE 10 MG/1
TABLET ORAL
Qty: 180 TABLET | Refills: 0 | Status: SHIPPED | OUTPATIENT
Start: 2021-09-25 | End: 2021-12-21

## 2021-09-25 NOTE — TELEPHONE ENCOUNTER
"Last Written Prescription Date:  9/20/20  Last Fill Quantity: 180,  # refills: 3   Last office visit provider:  4/15/21     Requested Prescriptions   Pending Prescriptions Disp Refills     glipiZIDE (GLUCOTROL) 10 MG tablet [Pharmacy Med Name: GLIPIZIDE 10MG TABLETS] 180 tablet 3     Sig: TAKE 1 TABLET(10 MG) BY MOUTH TWICE DAILY       Sulfonylurea Agents Failed - 9/24/2021 10:31 AM        Failed - Patient has documented A1c within the specified period of time.     If HgbA1C is 8 or greater, it needs to be on file within the past 3 months.  If less than 8, must be on file within the past 6 months.     Recent Labs   Lab Test 03/26/21  0931   A1C 9.0*             Passed - Medication is active on med list        Passed - Patient is age 18 or older        Passed - Patient has a recent creatinine (normal) within the past 12 mos.     Recent Labs   Lab Test 07/13/21  1519   CR 1.14       Ok to refill medication if creatinine is low          Passed - Recent (6 mo) or future (30 days) visit within the authorizing provider's specialty     Patient had office visit in the last 6 months or has a visit in the next 30 days with authorizing provider or within the authorizing provider's specialty.  See \"Patient Info\" tab in inbasket, or \"Choose Columns\" in Meds & Orders section of the refill encounter.                 ela ramirez RN 09/24/21 7:29 PM  "

## 2021-10-28 ENCOUNTER — MYC MEDICAL ADVICE (OUTPATIENT)
Dept: FAMILY MEDICINE | Facility: CLINIC | Age: 66
End: 2021-10-28

## 2021-10-30 ENCOUNTER — HEALTH MAINTENANCE LETTER (OUTPATIENT)
Age: 66
End: 2021-10-30

## 2021-11-04 ENCOUNTER — IMMUNIZATION (OUTPATIENT)
Dept: NURSING | Facility: CLINIC | Age: 66
End: 2021-11-04
Payer: COMMERCIAL

## 2021-11-04 PROCEDURE — 0004A PR COVID VAC PFIZER DIL RECON 30 MCG/0.3 ML IM: CPT

## 2021-11-04 PROCEDURE — 91300 PR COVID VAC PFIZER DIL RECON 30 MCG/0.3 ML IM: CPT

## 2022-01-07 DIAGNOSIS — I10 ESSENTIAL HYPERTENSION, BENIGN: ICD-10-CM

## 2022-01-07 DIAGNOSIS — I35.1 NONRHEUMATIC AORTIC VALVE INSUFFICIENCY: Primary | ICD-10-CM

## 2022-01-07 RX ORDER — AMLODIPINE BESYLATE 5 MG/1
TABLET ORAL
Qty: 180 TABLET | Refills: 0 | Status: SHIPPED | OUTPATIENT
Start: 2022-01-07 | End: 2022-04-22

## 2022-04-08 ENCOUNTER — TELEPHONE (OUTPATIENT)
Dept: CARDIOLOGY | Facility: CLINIC | Age: 67
End: 2022-04-08
Payer: COMMERCIAL

## 2022-04-08 DIAGNOSIS — I35.1 NONRHEUMATIC AORTIC VALVE INSUFFICIENCY: Primary | ICD-10-CM

## 2022-04-08 NOTE — TELEPHONE ENCOUNTER
[facility] Call Center    Phone Message    May a detailed message be left on voicemail: yes     Reason for Call: Order(s): Other:   Reason for requested: Echo  Date needed: Anytime in the next week  Provider name: Dr Wilkins  PT will schedule F/U after Echo  Please send to in-clinic scheduler after order has been placed     Action Taken: Other: cardiology    Travel Screening: Not Applicable                                                       Per 3/1/21 Office visit with Dr. Wilkins:               Plan:  I asked him to check blood pressure at home and call me if systolic blood pressures higher than 140     We discussed issues related to aortic stenosis and regurgitation.  I do not anticipate he will need aortic valve replacement in the near future.  We will need to keep blood pressure under good control to prevent worsening of aortic regurgitation.     Echo to reassess aortic valve in 1 year     Follow-up in 1 year      =echo order placed. msg to schedulers to please arrange. -[facility] [place]

## 2022-05-19 ENCOUNTER — HOSPITAL ENCOUNTER (OUTPATIENT)
Dept: CARDIOLOGY | Facility: CLINIC | Age: 67
Discharge: HOME | End: 2022-05-19
Attending: INTERNAL MEDICINE | Admitting: INTERNAL MEDICINE
Payer: COMMERCIAL

## 2022-05-19 DIAGNOSIS — I35.1 NONRHEUMATIC AORTIC VALVE INSUFFICIENCY: ICD-10-CM

## 2022-05-19 LAB — LVEF ECHO: NORMAL

## 2022-05-19 PROCEDURE — 93306 TTE W/DOPPLER COMPLETE: CPT | Mod: 26 | Performed by: INTERNAL MEDICINE

## 2022-05-19 PROCEDURE — 255N000002 HC RX 255 OP 636: Performed by: INTERNAL MEDICINE

## 2022-05-19 RX ADMIN — PERFLUTREN 4 ML: 6.52 INJECTION, SUSPENSION INTRAVENOUS at 10:35

## 2022-05-31 ENCOUNTER — OFFICE VISIT (OUTPATIENT)
Dept: CARDIOLOGY | Facility: CLINIC | Age: 67
End: 2022-05-31
Payer: COMMERCIAL

## 2022-05-31 VITALS
BODY MASS INDEX: 32.19 KG/M2 | RESPIRATION RATE: 18 BRPM | WEIGHT: 193.2 LBS | HEIGHT: 65 IN | SYSTOLIC BLOOD PRESSURE: 134 MMHG | HEART RATE: 71 BPM | DIASTOLIC BLOOD PRESSURE: 52 MMHG

## 2022-05-31 DIAGNOSIS — I35.0 NONRHEUMATIC AORTIC VALVE STENOSIS: Primary | ICD-10-CM

## 2022-05-31 PROCEDURE — 99214 OFFICE O/P EST MOD 30 MIN: CPT | Performed by: INTERNAL MEDICINE

## 2022-05-31 RX ORDER — IBUPROFEN 600 MG/1
600 TABLET, FILM COATED ORAL
COMMUNITY
Start: 2022-05-18

## 2022-05-31 NOTE — PATIENT INSTRUCTIONS
Vinicio Guaman,    It was a pleasure to see you today at the [facility].     My recommendations after this visit include:    Echo next year  Stay active  Same medications    IVANA Wilkins MD, FACC, JOAN

## 2022-05-31 NOTE — PROGRESS NOTES
"    Cardiology Progress Note     Assessment:  Coronary artery disease status post acute coronary syndrome(non-ST segment elevations anterior myocardial infarction) and stenting of severe mid LAD lesion in February 2014,  moderate distal left circumflex lesion, no angina  Calcific aortic valve disease with mild to moderate stenosis and mild regurgitation, asymptomatic(normal size of the aortic root and ascending aorta)  Diabetes mellitus    Obesity    Hypertension,  good control  Hypercholesterolemia, excellent LDL control  Cervical neuropathy with chronic upper extremity discomfort    Plan:  We discussed the results of recent echo.  I would anticipate that he will need replacement of the aortic valve in next 5 to 10 years.  Very low LDL levels are encouraging and I reassured him that progression of coronary artery disease is unlikely.    We will make no medication changes.    I encouraged him to stay physically active    Echo and follow-up in 1 year    Subjective:   This is 66 year old male who comes in today for follow-up visit.  He denies any exertional chest pain or shortness of breath.  On the rare occasion he will get sharp shooting pains in his arms.  The pains are similar to what he had before cervical stenosis surgery.  He denies weight gain, PND, orthopnea.  He has not had heart palpitations or syncope.    Review of Systems:   Negative other than history of present illness    Objective:   /52 (BP Location: Right arm, Patient Position: Sitting, Cuff Size: Adult Regular)   Pulse 71   Resp 18   Ht 1.651 m (5' 5\")   Wt 87.6 kg (193 lb 3.2 oz)   BMI 32.15 kg/m    Physical Exam:  GENERAL: no distress  NECK: No JVD  LUNGS: Clear to auscultation.  CARDIAC: regular rhythm, S1 & S2 normal.  No heaves, thrills, gallops.  Soft 2/6 systolic ejection murmur at the aortic area  ABDOMEN: flat, negative hepatosplenomegaly, soft and non-tender.  EXTREMITIES: No evidence of cyanosis, clubbing or edema.    Current " Outpatient Medications   Medication Sig Dispense Refill     amLODIPine (NORVASC) 5 MG tablet TAKE 1 TABLET(5 MG) BY MOUTH TWICE DAILY 180 tablet 0     aspirin 81 MG EC tablet [ASPIRIN 81 MG EC TABLET] Take 81 mg by mouth daily.       glipiZIDE (GLUCOTROL) 10 MG tablet TAKE 1 TABLET(10 MG) BY MOUTH TWICE DAILY 180 tablet 1     ibuprofen (ADVIL,MOTRIN) 600 MG tablet [IBUPROFEN (ADVIL,MOTRIN) 600 MG TABLET] Take 1 tablet (600 mg total) by mouth every 6 (six) hours as needed for pain. 60 tablet 0     ibuprofen (ADVIL/MOTRIN) 600 MG tablet Take 600 mg by mouth       lisinopril (ZESTRIL) 20 MG tablet TAKE 1 TABLET(20 MG) BY MOUTH TWICE DAILY 180 tablet 3     metFORMIN (GLUCOPHAGE) 1000 MG tablet [METFORMIN (GLUCOPHAGE) 1000 MG TABLET] TAKE 1 TABLET(1000 MG) BY MOUTH TWICE DAILY WITH MEALS 180 tablet 2     methocarbamoL (ROBAXIN) 750 MG tablet [METHOCARBAMOL (ROBAXIN) 750 MG TABLET] Take 1 tablet (750 mg total) by mouth 3 (three) times a day as needed (muscle spasms). 45 tablet 0     nitroglycerin (NITRO-BID) 2 % ointment [NITROGLYCERIN (NITRO-BID) 2 % OINTMENT] Place 0.5 inches on the skin 2 (two) times a day. To left ear area (to help get blood flow to the area)       rosuvastatin (CRESTOR) 40 MG tablet TAKE 1 TABLET(40 MG) BY MOUTH AT BEDTIME 90 tablet 1       Cardiographics:    Echocardiogram: November 2020    Normal left ventricular size and systolic function.The calculated left ventricular ejection fraction is 74%. This represents a normal ejection fraction. Mild concentric hypertrophy noted. E/e' 8 to 15, which is equivocal for estimating LV filling pressures.Left ventricular diastolic function is normal.    The left ventricular wall motion is normal.    Right Ventricle: Normal right ventricular size and systolic function.    Mild aortic stenosis. Moderate aortic regurgitation.    Mild tricuspid valve regurgitation. No pulmonary hypertension present. The estimated systolic pulmonary artery pressure is 29  mmhg.    When compared to the previous study dated 2/16/2015, there is now mild aortic stenosis and moderate aortic regurgitation.   May 2022  1.Left ventricular size, wall motion and function are normal. The ejection  fraction is > 65%.  2.There is mild concentric left ventricular hypertrophy.  3.Normal right ventricle size and systolic function.  4.There is mild (1+) aortic regurgitation. Decel pressure half-time is 685 ms  at a heart rate of 70 bpm. Moderate valvular aortic stenosis.At heart rate of  70 peak velocity is 3.2 m/s with a mean gradient 20 mmHg with a dimensionless  index of 0.67.  Compared to the prior study dated 11/12/2020, the Aortic stenosis is slightly  worse, prior peak velocity was 2.3 m/s.       Stress Test: November 2016   Lexiscan stress nuclear study is negative for inducible myocardial   ischemia or infarction.       Coronary angio: February 2015   Ant STEMI  95% thrombotic lesion in mid LAD, treated successfully with  2.75x16 Promus BYRON  0% residual, Indigo 3 flow pre and post  Moderate disease in distal Cx, and mild disease in RCA  LVEF 65%  EDP 17mmHg     Lab Results    Chemistry/lipid CBC Cardiac Enzymes/BNP/TSH/INR   Recent Labs   Lab Test 07/13/21  1519   CHOL 112   HDL 48   LDL 36   TRIG 140     Recent Labs   Lab Test 07/13/21  1519 03/26/21  0931 09/18/20  0857   LDL 36 33 36     Recent Labs   Lab Test 07/13/21  1519      POTASSIUM 4.8   CHLORIDE 104   CO2 21*   GLC 87   BUN 27*   CR 1.14   GFRESTIMATED 68   KITTY 10.1     Recent Labs   Lab Test 07/13/21  1519 03/26/21  0931 11/14/20  0534   CR 1.14 1.00 0.77     Recent Labs   Lab Test 03/26/21  0931 09/18/20  0857 12/19/19  1113   A1C 9.0* 7.2* 8.1*          Recent Labs   Lab Test 11/16/20  1125 11/16/20  0728 11/15/20  0607   WBC  --   --  5.9   HGB 8.1* 7.6* 7.6*   HCT  --   --  22.4*   MCV  --   --  91   PLT  --  190 190     Recent Labs   Lab Test 11/16/20  1125 11/16/20  0728 11/15/20  0607   HGB 8.1* 7.6* 7.6*    No  results for input(s): TROPONINI in the last 42606 hours.  No results for input(s): BNP, NTBNPI, NTBNP in the last 81148 hours.  No results for input(s): TSH in the last 59846 hours.  Recent Labs   Lab Test 11/11/20  1534 11/11/20  1508   INR 1.02 0.97

## 2022-05-31 NOTE — LETTER
"5/31/2022    Cynthia Ellsworth MD  [address]  [address]  [place] [address]    RE: Vinicio Guaman       Dear Colleague,     I had the pleasure of seeing Vinicio Guaman in the [facility].      Cardiology Progress Note     Assessment:  Coronary artery disease status post acute coronary syndrome(non-ST segment elevations anterior myocardial infarction) and stenting of severe mid LAD lesion in February 2014,  moderate distal left circumflex lesion, no angina  Calcific aortic valve disease with mild to moderate stenosis and mild regurgitation, asymptomatic(normal size of the aortic root and ascending aorta)  Diabetes mellitus    Obesity    Hypertension,  good control  Hypercholesterolemia, excellent LDL control  Cervical neuropathy with chronic upper extremity discomfort    Plan:  We discussed the results of recent echo.  I would anticipate that he will need replacement of the aortic valve in next 5 to 10 years.  Very low LDL levels are encouraging and I reassured him that progression of coronary artery disease is unlikely.    We will make no medication changes.    I encouraged him to stay physically active    Echo and follow-up in 1 year    Subjective:   This is 66 year old male who comes in today for follow-up visit.  He denies any exertional chest pain or shortness of breath.  On the rare occasion he will get sharp shooting pains in his arms.  The pains are similar to what he had before cervical stenosis surgery.  He denies weight gain, PND, orthopnea.  He has not had heart palpitations or syncope.    Review of Systems:   Negative other than history of present illness    Objective:   /52 (BP Location: Right arm, Patient Position: Sitting, Cuff Size: Adult Regular)   Pulse 71   Resp 18   Ht 1.651 m (5' 5\")   Wt 87.6 kg (193 lb 3.2 oz)   BMI 32.15 kg/m    Physical Exam:  GENERAL: no distress  NECK: No JVD  LUNGS: Clear to auscultation.  CARDIAC: regular rhythm, S1 & S2 " normal.  No heaves, thrills, gallops.  Soft 2/6 systolic ejection murmur at the aortic area  ABDOMEN: flat, negative hepatosplenomegaly, soft and non-tender.  EXTREMITIES: No evidence of cyanosis, clubbing or edema.    Current Outpatient Medications   Medication Sig Dispense Refill     amLODIPine (NORVASC) 5 MG tablet TAKE 1 TABLET(5 MG) BY MOUTH TWICE DAILY 180 tablet 0     aspirin 81 MG EC tablet [ASPIRIN 81 MG EC TABLET] Take 81 mg by mouth daily.       glipiZIDE (GLUCOTROL) 10 MG tablet TAKE 1 TABLET(10 MG) BY MOUTH TWICE DAILY 180 tablet 1     ibuprofen (ADVIL,MOTRIN) 600 MG tablet [IBUPROFEN (ADVIL,MOTRIN) 600 MG TABLET] Take 1 tablet (600 mg total) by mouth every 6 (six) hours as needed for pain. 60 tablet 0     ibuprofen (ADVIL/MOTRIN) 600 MG tablet Take 600 mg by mouth       lisinopril (ZESTRIL) 20 MG tablet TAKE 1 TABLET(20 MG) BY MOUTH TWICE DAILY 180 tablet 3     metFORMIN (GLUCOPHAGE) 1000 MG tablet [METFORMIN (GLUCOPHAGE) 1000 MG TABLET] TAKE 1 TABLET(1000 MG) BY MOUTH TWICE DAILY WITH MEALS 180 tablet 2     methocarbamoL (ROBAXIN) 750 MG tablet [METHOCARBAMOL (ROBAXIN) 750 MG TABLET] Take 1 tablet (750 mg total) by mouth 3 (three) times a day as needed (muscle spasms). 45 tablet 0     nitroglycerin (NITRO-BID) 2 % ointment [NITROGLYCERIN (NITRO-BID) 2 % OINTMENT] Place 0.5 inches on the skin 2 (two) times a day. To left ear area (to help get blood flow to the area)       rosuvastatin (CRESTOR) 40 MG tablet TAKE 1 TABLET(40 MG) BY MOUTH AT BEDTIME 90 tablet 1       Cardiographics:    Echocardiogram: November 2020    Normal left ventricular size and systolic function.The calculated left ventricular ejection fraction is 74%. This represents a normal ejection fraction. Mild concentric hypertrophy noted. E/e' 8 to 15, which is equivocal for estimating LV filling pressures.Left ventricular diastolic function is normal.    The left ventricular wall motion is normal.    Right Ventricle: Normal right  ventricular size and systolic function.    Mild aortic stenosis. Moderate aortic regurgitation.    Mild tricuspid valve regurgitation. No pulmonary hypertension present. The estimated systolic pulmonary artery pressure is 29 mmhg.    When compared to the previous study dated 2/16/2015, there is now mild aortic stenosis and moderate aortic regurgitation.   May 2022  1.Left ventricular size, wall motion and function are normal. The ejection  fraction is > 65%.  2.There is mild concentric left ventricular hypertrophy.  3.Normal right ventricle size and systolic function.  4.There is mild (1+) aortic regurgitation. Decel pressure half-time is 685 ms  at a heart rate of 70 bpm. Moderate valvular aortic stenosis.At heart rate of  70 peak velocity is 3.2 m/s with a mean gradient 20 mmHg with a dimensionless  index of 0.67.  Compared to the prior study dated 11/12/2020, the Aortic stenosis is slightly  worse, prior peak velocity was 2.3 m/s.       Stress Test: November 2016   Lexiscan stress nuclear study is negative for inducible myocardial   ischemia or infarction.       Coronary angio: February 2015   Ant STEMI  95% thrombotic lesion in mid LAD, treated successfully with  2.75x16 Promus BYRON  0% residual, Indigo 3 flow pre and post  Moderate disease in distal Cx, and mild disease in RCA  LVEF 65%  EDP 17mmHg     Lab Results    Chemistry/lipid CBC Cardiac Enzymes/BNP/TSH/INR   Recent Labs   Lab Test 07/13/21  1519   CHOL 112   HDL 48   LDL 36   TRIG 140     Recent Labs   Lab Test 07/13/21  1519 03/26/21  0931 09/18/20  0857   LDL 36 33 36     Recent Labs   Lab Test 07/13/21  1519      POTASSIUM 4.8   CHLORIDE 104   CO2 21*   GLC 87   BUN 27*   CR 1.14   GFRESTIMATED 68   KITTY 10.1     Recent Labs   Lab Test 07/13/21  1519 03/26/21  0931 11/14/20  0534   CR 1.14 1.00 0.77     Recent Labs   Lab Test 03/26/21  0931 09/18/20  0857 12/19/19  1113   A1C 9.0* 7.2* 8.1*          Recent Labs   Lab Test 11/16/20  1125  11/16/20  0728 11/15/20  0607   WBC  --   --  5.9   HGB 8.1* 7.6* 7.6*   HCT  --   --  22.4*   MCV  --   --  91   PLT  --  190 190     Recent Labs   Lab Test 11/16/20  1125 11/16/20  0728 11/15/20  0607   HGB 8.1* 7.6* 7.6*    No results for input(s): TROPONINI in the last 37526 hours.  No results for input(s): BNP, NTBNPI, NTBNP in the last 29279 hours.  No results for input(s): TSH in the last 28652 hours.  Recent Labs   Lab Test 11/11/20  1534 11/11/20  1508   INR 1.02 0.97            Thank you for allowing me to participate in the care of your patient.    Sincerely,   Jose Wilkins MD   [facility]

## 2022-06-09 ENCOUNTER — TELEPHONE (OUTPATIENT)
Dept: NEUROSURGERY | Facility: CLINIC | Age: 67
End: 2022-06-09
Payer: COMMERCIAL

## 2022-06-09 NOTE — TELEPHONE ENCOUNTER
06.09.22- Patient called stating he is have Lumbar pain, responds to ice and medication. Going on for 3 weeks now. Legs seem to be getting weaker. Says he doesn't walk with good stability. Patient states he doesn't feel this is an emergency situation but he would just like to see how he should proceed. Patient last saw Topher 01.12.2021.    Good call back number [phone]  Thank you!

## 2022-06-09 NOTE — TELEPHONE ENCOUNTER
"Returned call to pt who reports his legs are \"not quite stable\". He reports intermittent right foot nerve \"over excitement\". Denies bladder changes, endorses constipation for a few days.     Pt states these are the same symptoms he had prior to surgery on his cervical spine. He declined any imaging prior to an appt and insisted to only be scheduled with Dr. Obando.     Liyah Ham RN   "

## 2022-06-21 ENCOUNTER — OFFICE VISIT (OUTPATIENT)
Dept: NEUROSURGERY | Facility: CLINIC | Age: 67
End: 2022-06-21
Payer: COMMERCIAL

## 2022-06-21 VITALS
HEART RATE: 69 BPM | WEIGHT: 193 LBS | SYSTOLIC BLOOD PRESSURE: 155 MMHG | DIASTOLIC BLOOD PRESSURE: 73 MMHG | OXYGEN SATURATION: 97 % | HEIGHT: 65 IN | BODY MASS INDEX: 32.15 KG/M2

## 2022-06-21 DIAGNOSIS — R26.89 IMBALANCE: Primary | ICD-10-CM

## 2022-06-21 PROCEDURE — 99213 OFFICE O/P EST LOW 20 MIN: CPT | Performed by: SURGERY

## 2022-06-21 NOTE — PATIENT INSTRUCTIONS
Balance therapy  Also work on core strenght  If no improvement will  reiaminge cervnorberto gilliam thoracic spine    call our office as needed

## 2022-06-21 NOTE — LETTER
"    6/21/2022         RE: Vinicio Guaman  [address] [place] [address]        Dear Colleague,    Thank you for referring your patient, Vinicio Guaman, to the [facility] NEUROSURGERY [facility]INIC [facility]. Please see a copy of my visit note below.    NEUROSURGERY FOLLOWUP  NOTE    Vinicio Guaman comes today in f/u. Patient is a 65 yo male who is s/p cervical 3-cervical 7 left open door laminoplasty and  thoracic 1-thoracic 2 bilateral laminectomies on 11/12/20.  A little bit ago had really bad back pain. This improved with ibuprofen and 1/2 tab of oxycodone and robaxin. No significant back pain now. Feels like he is still off with his balance. He feels the more he is upright he is better with regards to his balance. Also wondering about strength exercsies to improve his strength.     PHYSICAL EXAM:   Constitutional: BP (!) 155/73   Pulse 69   Ht 5' 5\" (1.651 m)   Wt 193 lb (87.5 kg)   SpO2 97%   BMI 32.12 kg/m       Mental Status: A & O in no acute distress.  Affect is appropriate.  Speech is fluent.  Recent and remote memory are intact.  Attention span and concentration are normal.     Motor:  Normal bulk and tone all muscle groups of upper and lower extremities.     Sensory: Sensation intact bilaterally to light touch.      Coordination:  intact tandem gait      Reflexes; supinator, biceps, triceps, knee/ ankle jerk intact. no hoffmans    IMAGING:   I personally reviewed all radiographic images        CONSULTATION ASSESSMENT AND PLAN:    We discussed a trial of balance therapy and working on core strengthening. If no improvement recommend cervical and thoracic MRI.    I spent more than 20 minutes in this apt, examining the pt, reviewing the scans, reviewing notes from chart, discussing treatment options with risks and benefits and coordinating care.     Dawn Obando MD      CC:     Cynthia Ellsworth  [address] [facility]  [address] [address]        Again, thank you for " allowing me to participate in the care of your patient.        Sincerely,        Dawn Obando MD

## 2022-06-24 ENCOUNTER — HOSPITAL ENCOUNTER (OUTPATIENT)
Dept: PHYSICAL THERAPY | Facility: REHABILITATION | Age: 67
Discharge: HOME | End: 2022-06-24
Attending: SURGERY
Payer: COMMERCIAL

## 2022-06-24 DIAGNOSIS — R26.89 IMBALANCE: ICD-10-CM

## 2022-06-24 PROCEDURE — 97110 THERAPEUTIC EXERCISES: CPT | Mod: GP | Performed by: PHYSICAL THERAPIST

## 2022-06-24 PROCEDURE — 97162 PT EVAL MOD COMPLEX 30 MIN: CPT | Mod: GP | Performed by: PHYSICAL THERAPIST

## 2022-06-24 NOTE — PROGRESS NOTES
"Subjective: Had severe back pain a little while ago but feels better now. Feels like his balance and strength are both impaired still. Wants to build up core strength to help prevent future back issues.    Assessment: Vinicio presents to therapy with impaired balance and slight left hip weakness. His balance is slightly more impaired on his right than his left despite the reverse being true for strength deficits. He will benefit from PT to promote an increase in his overall balance and stability.     Therapeutic Exercise    Date 6/24/22   Exercise    Eccentric squat 4\" x15   Tandem stance 2 x30\" B   SLS 2 x30\" B   Standing hip abduction x15 B                                         "

## 2022-07-08 ENCOUNTER — MYC MEDICAL ADVICE (OUTPATIENT)
Dept: CARDIOLOGY | Facility: CLINIC | Age: 67
End: 2022-07-08

## 2022-07-08 DIAGNOSIS — I10 ESSENTIAL HYPERTENSION, BENIGN: ICD-10-CM

## 2022-07-08 RX ORDER — LISINOPRIL 20 MG/1
TABLET ORAL
Qty: 180 TABLET | Refills: 3 | Status: SHIPPED | OUTPATIENT
Start: 2022-07-08 | End: 2023-07-03

## 2022-08-02 NOTE — PROGRESS NOTES
"[facility]    Outpatient Physical Therapy Discharge Note  Patient: Vinicio Guaman  : 1955    Beginning/End Dates of Reporting Period:  22 to 22    Referring Provider: Dawn Obando MD    Therapy Diagnosis: Impaired balance, alterred gait pattern.     Client Self Report: See note      Goals:  Goal Identifier LEFS   Goal Description Patient will demonstrate a decrease in pain and increase in function as measured by scoring >/= 60/80 on the LEFS.   Target Date 22   Date Met      Progress (detail required for progress note): 48/80     Goal Identifier Romberg   Goal Description Patient will be able to standin in sharpened Romberg with eyes closed for at least 30\".   Target Date 22   Date Met      Progress (detail required for progress note): 15\" eyes open     Goal Identifier FGA   Goal Description Patient will demonstrate improved balance and endurance as measured by scoring >/= 28/30 on the FGA.   Target Date 22   Date Met      Progress (detail required for progress note):        Plan:  Discharge from therapy.    Discharge:    Reason for Discharge: Patient has failed to schedule further appointments.    Discharge Plan: Patient to continue home program.  "

## 2022-08-02 NOTE — ADDENDUM NOTE
Encounter addended by: Rakesh Dickson, PT on: 8/2/2022 9:59 AM   Actions taken: Episode resolved, Clinical Note Signed

## 2022-10-22 ENCOUNTER — HEALTH MAINTENANCE LETTER (OUTPATIENT)
Age: 67
End: 2022-10-22

## 2023-01-06 DIAGNOSIS — I35.0 NONRHEUMATIC AORTIC VALVE STENOSIS: ICD-10-CM

## 2023-01-06 DIAGNOSIS — I25.10 CORONARY ARTERY DISEASE DUE TO LIPID RICH PLAQUE: Primary | ICD-10-CM

## 2023-01-06 DIAGNOSIS — I25.83 CORONARY ARTERY DISEASE DUE TO LIPID RICH PLAQUE: Primary | ICD-10-CM

## 2023-01-06 DIAGNOSIS — I10 ESSENTIAL HYPERTENSION, BENIGN: ICD-10-CM

## 2023-01-20 DIAGNOSIS — I10 ESSENTIAL HYPERTENSION, BENIGN: ICD-10-CM

## 2023-01-20 RX ORDER — AMLODIPINE BESYLATE 5 MG/1
TABLET ORAL
Qty: 180 TABLET | Refills: 1 | Status: SHIPPED | OUTPATIENT
Start: 2023-01-20 | End: 2023-06-30

## 2023-03-28 ENCOUNTER — TELEPHONE (OUTPATIENT)
Dept: CARDIOLOGY | Facility: CLINIC | Age: 68
End: 2023-03-28
Payer: COMMERCIAL

## 2023-03-28 NOTE — TELEPHONE ENCOUNTER
Called patient back to address his concerns. He states that he will be having cataract surgery end of April. He wonders if [facility] will want to see him prior. He is due for annual visit in May. Writer explained that he should check with his eye doctor and PMD if he needs cardiac risk assessment prior. This would come at the request typically of the MD doing the surgery or due to anesthesia risk. He is not on any blood thinners that need to be held. He will [place] back with his PMD and eye doctor to see if any testing is needed prior to cataract surgery. -[facility]      Vinicio Livingston wanted to make you aware that he is having cataract surgery at the end of the month. Acceptable to proceed at acceptable risk?   Thanks,  Mal

## 2023-03-28 NOTE — TELEPHONE ENCOUNTER
Health Call Center    Phone Message    May a detailed message be left on voicemail: yes     Reason for Call: Other: Vinicio called to inform Dr. Wilkins that he is having cataract surgery on 4/25 and was wondering if he should have a pre-surgery appt with Dr. Wilkins even though he feels fine. Please reach out to Vinicio to advise. Thank you!     Action Taken: Other: Cardiology    Travel Screening: Not Applicable     Thank you!  [facility]

## 2023-03-28 NOTE — TELEPHONE ENCOUNTER
As long as he has no new cardiac symptoms he can undergo eye surgery Those are a low risk procedures

## 2023-03-29 NOTE — TELEPHONE ENCOUNTER
Called patient and updated on message from [facility]. He verbalized understanding and had no further questions or concerns. -[facility]

## 2023-05-16 ENCOUNTER — TELEPHONE (OUTPATIENT)
Dept: FAMILY MEDICINE | Facility: CLINIC | Age: 68
End: 2023-05-16
Payer: COMMERCIAL

## 2023-05-16 NOTE — TELEPHONE ENCOUNTER
Patient Quality Outreach    Patient is due for the following:   Hypertension -  BP check    Next Steps:   No follow up needed at this time.    Type of outreach:    Sent [employer] message.      Questions for provider review:    None           Deisy Messer RN  Chart routed to n/a.

## 2023-05-31 ENCOUNTER — HOSPITAL ENCOUNTER (OUTPATIENT)
Dept: CARDIOLOGY | Facility: CLINIC | Age: 68
Discharge: HOME | End: 2023-05-31
Attending: INTERNAL MEDICINE | Admitting: INTERNAL MEDICINE
Payer: COMMERCIAL

## 2023-05-31 DIAGNOSIS — I35.0 NONRHEUMATIC AORTIC VALVE STENOSIS: ICD-10-CM

## 2023-05-31 LAB — LVEF ECHO: NORMAL

## 2023-05-31 PROCEDURE — 93306 TTE W/DOPPLER COMPLETE: CPT

## 2023-05-31 PROCEDURE — 93306 TTE W/DOPPLER COMPLETE: CPT | Mod: 26 | Performed by: INTERNAL MEDICINE

## 2023-06-15 ENCOUNTER — TELEPHONE (OUTPATIENT)
Dept: FAMILY MEDICINE | Facility: CLINIC | Age: 68
End: 2023-06-15
Payer: COMMERCIAL

## 2023-06-15 NOTE — TELEPHONE ENCOUNTER
Patient Quality Outreach    Patient is due for the following:   Diabetes -  LDL (Fasting), Eye Exam, Microalbumin and Foot Exam    Next Steps:   Schedule a office visit for DM follow up and establish care    Type of outreach:    Sent [employer] message.      Questions for provider review:    None           Niat Verdugo  Chart routed to NA.

## 2023-06-15 NOTE — TELEPHONE ENCOUNTER
Patient states that he switched primary providers/clinics to  Liyah Washington at [facility].  Updated chart pcp per his request.

## 2023-06-30 ENCOUNTER — OFFICE VISIT (OUTPATIENT)
Dept: CARDIOLOGY | Facility: CLINIC | Age: 68
End: 2023-06-30
Attending: INTERNAL MEDICINE
Payer: COMMERCIAL

## 2023-06-30 VITALS
HEIGHT: 66 IN | HEART RATE: 73 BPM | SYSTOLIC BLOOD PRESSURE: 132 MMHG | OXYGEN SATURATION: 94 % | BODY MASS INDEX: 32.3 KG/M2 | WEIGHT: 201 LBS | RESPIRATION RATE: 16 BRPM | DIASTOLIC BLOOD PRESSURE: 58 MMHG

## 2023-06-30 DIAGNOSIS — I10 ESSENTIAL HYPERTENSION, BENIGN: ICD-10-CM

## 2023-06-30 DIAGNOSIS — I25.83 CORONARY ARTERY DISEASE DUE TO LIPID RICH PLAQUE: ICD-10-CM

## 2023-06-30 DIAGNOSIS — I25.10 CORONARY ARTERY DISEASE DUE TO LIPID RICH PLAQUE: ICD-10-CM

## 2023-06-30 DIAGNOSIS — I35.0 NONRHEUMATIC AORTIC VALVE STENOSIS: Primary | ICD-10-CM

## 2023-06-30 PROCEDURE — 99214 OFFICE O/P EST MOD 30 MIN: CPT | Performed by: INTERNAL MEDICINE

## 2023-06-30 RX ORDER — AMLODIPINE BESYLATE 5 MG/1
5 TABLET ORAL DAILY
Qty: 90 TABLET | Refills: 3 | Status: SHIPPED | OUTPATIENT
Start: 2023-06-30

## 2023-06-30 RX ORDER — TERAZOSIN 2 MG/1
2 CAPSULE ORAL AT BEDTIME
COMMUNITY

## 2023-06-30 RX ORDER — TRAMADOL HYDROCHLORIDE 50 MG/1
TABLET ORAL
COMMUNITY
Start: 2023-06-22

## 2023-06-30 RX ORDER — ROSUVASTATIN CALCIUM 40 MG/1
40 TABLET, COATED ORAL AT BEDTIME
Qty: 90 TABLET | Refills: 3 | Status: SHIPPED | OUTPATIENT
Start: 2023-06-30

## 2023-06-30 NOTE — LETTER
"6/30/2023    Liyah Washington MD  [address]  [place] [address]    RE: Vinicio Guaman       Dear Colleague,     I had the pleasure of seeing Vinicio Guaman in the [facility].      Cardiology Progress Note     Assessment:  Coronary artery disease status post acute coronary syndrome(non-ST segment elevations anterior myocardial infarction) and stenting of severe mid LAD lesion in February 2014,  moderate distal left circumflex lesion, no angina  Calcific aortic valve disease with mild to moderate stenosis and mild regurgitation, asymptomatic(normal size of the aortic root and ascending aorta), stable  Diabetes mellitus    Obesity    Hypertension,  good control  Hypercholesterolemia, excellent LDL control      Plan:  Continue current cardiac medications  Reassess severity of aortic stenosis with echo next year    Follow-up in 1 year    Subjective:   This is 67 year old male who comes in today for annual follow-up visit.  He has done well.  He denies chest pain or shortness of breath.  He has not had heart palpitations syncope.  The major limitations comes from his back pain and hip pain.  He does not walk much.  He has gained some weight.  He is frustrated about it.  He has not had heart palpitations or syncope.    Review of Systems:   Negative other than history of present illness    Objective:   /58 (BP Location: Left arm, Patient Position: Sitting, Cuff Size: Adult Large)   Pulse 73   Resp 16   Ht 1.664 m (5' 5.5\")   Wt 91.2 kg (201 lb)   SpO2 94%   BMI 32.94 kg/m    Physical Exam:  GENERAL: no distress  NECK: No JVD  LUNGS: Clear to auscultation.  CARDIAC: regular rhythm, S1 & S2 normal.  No heaves, thrills, gallops 2/6 systolic ejection murmur at the aortic area  ABDOMEN: flat, negative hepatosplenomegaly, soft and non-tender.  EXTREMITIES: No evidence of cyanosis, clubbing or edema.    Current Outpatient Medications   Medication Sig Dispense Refill    amLODIPine " (NORVASC) 5 MG tablet Take 1 tablet (5 mg) by mouth daily 90 tablet 3    aspirin 81 MG EC tablet [ASPIRIN 81 MG EC TABLET] Take 81 mg by mouth daily.      glipiZIDE (GLUCOTROL) 10 MG tablet TAKE 1 TABLET(10 MG) BY MOUTH TWICE DAILY 180 tablet 1    ibuprofen (ADVIL/MOTRIN) 600 MG tablet Take 600 mg by mouth      lisinopril (ZESTRIL) 20 MG tablet TAKE 1 TABLET(20 MG) BY MOUTH TWICE DAILY 180 tablet 3    metFORMIN (GLUCOPHAGE) 1000 MG tablet [METFORMIN (GLUCOPHAGE) 1000 MG TABLET] TAKE 1 TABLET(1000 MG) BY MOUTH TWICE DAILY WITH MEALS 180 tablet 2    methocarbamoL (ROBAXIN) 750 MG tablet [METHOCARBAMOL (ROBAXIN) 750 MG TABLET] Take 1 tablet (750 mg total) by mouth 3 (three) times a day as needed (muscle spasms). 45 tablet 0    nitroglycerin (NITRO-BID) 2 % ointment [NITROGLYCERIN (NITRO-BID) 2 % OINTMENT] Place 0.5 inches on the skin 2 (two) times a day. To left ear area (to help get blood flow to the area)      rosuvastatin (CRESTOR) 40 MG tablet Take 1 tablet (40 mg) by mouth At Bedtime 90 tablet 3    terazosin (HYTRIN) 2 MG capsule Take 2 mg by mouth At Bedtime      tiZANidine (ZANAFLEX) 4 MG tablet       traMADol (ULTRAM) 50 MG tablet          Cardiographics:    Echocardiogram: November 2020  Normal left ventricular size and systolic function.The calculated left ventricular ejection fraction is 74%. This represents a normal ejection fraction. Mild concentric hypertrophy noted. E/e' 8 to 15, which is equivocal for estimating LV filling pressures.Left ventricular diastolic function is normal.  The left ventricular wall motion is normal.  Right Ventricle: Normal right ventricular size and systolic function.  Mild aortic stenosis. Moderate aortic regurgitation.  Mild tricuspid valve regurgitation. No pulmonary hypertension present. The estimated systolic pulmonary artery pressure is 29 mmhg.  When compared to the previous study dated 2/16/2015, there is now mild aortic stenosis and moderate aortic regurgitation.   May  2022  1.Left ventricular size, wall motion and function are normal. The ejection  fraction is > 65%.  2.There is mild concentric left ventricular hypertrophy.  3.Normal right ventricle size and systolic function.  4.There is mild (1+) aortic regurgitation. Decel pressure half-time is 685 ms  at a heart rate of 70 bpm. Moderate valvular aortic stenosis.At heart rate of  70 peak velocity is 3.2 m/s with a mean gradient 20 mmHg with a dimensionless  index of 0.67.  Compared to the prior study dated 11/12/2020, the Aortic stenosis is slightly  worse, prior peak velocity was 2.3 m/s.    May 2023  Normal left ventricular size. Mild left ventricular hypertrophy. Left  ventricular systolic function is normal. Left ventricular ejection fraction is  estimated at 55 to 60%. Cannot exclude mild hypokinesis of the basilar  inferior wall.Diastolic Doppler findings (E/E' ratio and/or other parameters)  suggest left ventricular filling pressures are normal  Normal right ventricular size and systolic function.  Trace to mild tricuspid regurgitation. Estimate of RV systolic pressure is  normal at 20 mmHg plus right atrial pressure.  Mild to moderate calcific aortic stenosis. Peak velocity 2.7 m/s. Mean  gradient of 16 mmHg. Mild to moderate aortic insufficiency.  Compared to the examination dated May 2022. The peak velocity and mean  gradients are slightly less on the current examination when compared to  previous. The peak velocity on the prior study was 2.8 m/s with a mean  gradient of 20 mmHg. Left ventricular systolic function was more vigorous on  the prior examination.     Stress Test: November 2016   Lexiscan stress nuclear study is negative for inducible myocardial   ischemia or infarction.       Coronary angio: February 2015   Ant STEMI  95% thrombotic lesion in mid LAD, treated successfully with  2.75x16 Promus BYRON  0% residual, Indigo 3 flow pre and post  Moderate disease in distal Cx, and mild disease in RCA  LVEF 65%  EDP  17mmHg     Lab Results    Chemistry/lipid CBC Cardiac Enzymes/BNP/TSH/INR   Recent Labs   Lab Test 07/13/21  1519   CHOL 112   HDL 48   LDL 36   TRIG 140     Recent Labs   Lab Test 07/13/21  1519 03/26/21  0931 09/18/20  0857   LDL 36 33 36     Recent Labs   Lab Test 07/13/21  1519      POTASSIUM 4.8   CHLORIDE 104   CO2 21*   GLC 87   BUN 27*   CR 1.14   GFRESTIMATED 68   KITTY 10.1     Recent Labs   Lab Test 07/13/21  1519 03/26/21  0931 11/14/20  0534   CR 1.14 1.00 0.77     Recent Labs   Lab Test 03/26/21  0931 09/18/20  0857 12/19/19  1113   A1C 9.0* 7.2* 8.1*          Recent Labs   Lab Test 11/16/20  1125 11/16/20  0728 11/15/20  0607   WBC  --   --  5.9   HGB 8.1* 7.6* 7.6*   HCT  --   --  22.4*   MCV  --   --  91   PLT  --  190 190     Recent Labs   Lab Test 11/16/20  1125 11/16/20  0728 11/15/20  0607   HGB 8.1* 7.6* 7.6*    No results for input(s): TROPONINI in the last 75668 hours.  No results for input(s): BNP, NTBNPI, NTBNP in the last 19548 hours.  No results for input(s): TSH in the last 83355 hours.  Recent Labs   Lab Test 11/11/20  1534 11/11/20  1508   INR 1.02 0.97                        Thank you for allowing me to participate in the care of your patient.      Sincerely,     Jose Wilkins MD     [facility]  cc:   Jose Wilkins MD  [address]  [address]  [place] [address]

## 2023-06-30 NOTE — PATIENT INSTRUCTIONS
Vinicio Guaman,    It was a pleasure to see you today at the [facility].     My recommendations after this visit include:    Echo and follow up next year    IVANA Wilkins MD, FACC, JOAN

## 2023-06-30 NOTE — PROGRESS NOTES
"    Cardiology Progress Note     Assessment:  Coronary artery disease status post acute coronary syndrome(non-ST segment elevations anterior myocardial infarction) and stenting of severe mid LAD lesion in February 2014,  moderate distal left circumflex lesion, no angina  Calcific aortic valve disease with mild to moderate stenosis and mild regurgitation, asymptomatic(normal size of the aortic root and ascending aorta), stable  Diabetes mellitus    Obesity    Hypertension,  good control  Hypercholesterolemia, excellent LDL control      Plan:  Continue current cardiac medications  Reassess severity of aortic stenosis with echo next year    Follow-up in 1 year    Subjective:   This is 67 year old male who comes in today for annual follow-up visit.  He has done well.  He denies chest pain or shortness of breath.  He has not had heart palpitations syncope.  The major limitations comes from his back pain and hip pain.  He does not walk much.  He has gained some weight.  He is frustrated about it.  He has not had heart palpitations or syncope.    Review of Systems:   Negative other than history of present illness    Objective:   /58 (BP Location: Left arm, Patient Position: Sitting, Cuff Size: Adult Large)   Pulse 73   Resp 16   Ht 1.664 m (5' 5.5\")   Wt 91.2 kg (201 lb)   SpO2 94%   BMI 32.94 kg/m    Physical Exam:  GENERAL: no distress  NECK: No JVD  LUNGS: Clear to auscultation.  CARDIAC: regular rhythm, S1 & S2 normal.  No heaves, thrills, gallops 2/6 systolic ejection murmur at the aortic area  ABDOMEN: flat, negative hepatosplenomegaly, soft and non-tender.  EXTREMITIES: No evidence of cyanosis, clubbing or edema.    Current Outpatient Medications   Medication Sig Dispense Refill     amLODIPine (NORVASC) 5 MG tablet Take 1 tablet (5 mg) by mouth daily 90 tablet 3     aspirin 81 MG EC tablet [ASPIRIN 81 MG EC TABLET] Take 81 mg by mouth daily.       glipiZIDE (GLUCOTROL) 10 MG tablet TAKE 1 TABLET(10 MG) " BY MOUTH TWICE DAILY 180 tablet 1     ibuprofen (ADVIL/MOTRIN) 600 MG tablet Take 600 mg by mouth       lisinopril (ZESTRIL) 20 MG tablet TAKE 1 TABLET(20 MG) BY MOUTH TWICE DAILY 180 tablet 3     metFORMIN (GLUCOPHAGE) 1000 MG tablet [METFORMIN (GLUCOPHAGE) 1000 MG TABLET] TAKE 1 TABLET(1000 MG) BY MOUTH TWICE DAILY WITH MEALS 180 tablet 2     methocarbamoL (ROBAXIN) 750 MG tablet [METHOCARBAMOL (ROBAXIN) 750 MG TABLET] Take 1 tablet (750 mg total) by mouth 3 (three) times a day as needed (muscle spasms). 45 tablet 0     nitroglycerin (NITRO-BID) 2 % ointment [NITROGLYCERIN (NITRO-BID) 2 % OINTMENT] Place 0.5 inches on the skin 2 (two) times a day. To left ear area (to help get blood flow to the area)       rosuvastatin (CRESTOR) 40 MG tablet Take 1 tablet (40 mg) by mouth At Bedtime 90 tablet 3     terazosin (HYTRIN) 2 MG capsule Take 2 mg by mouth At Bedtime       tiZANidine (ZANAFLEX) 4 MG tablet        traMADol (ULTRAM) 50 MG tablet          Cardiographics:    Echocardiogram: November 2020    Normal left ventricular size and systolic function.The calculated left ventricular ejection fraction is 74%. This represents a normal ejection fraction. Mild concentric hypertrophy noted. E/e' 8 to 15, which is equivocal for estimating LV filling pressures.Left ventricular diastolic function is normal.    The left ventricular wall motion is normal.    Right Ventricle: Normal right ventricular size and systolic function.    Mild aortic stenosis. Moderate aortic regurgitation.    Mild tricuspid valve regurgitation. No pulmonary hypertension present. The estimated systolic pulmonary artery pressure is 29 mmhg.    When compared to the previous study dated 2/16/2015, there is now mild aortic stenosis and moderate aortic regurgitation.   May 2022  1.Left ventricular size, wall motion and function are normal. The ejection  fraction is > 65%.  2.There is mild concentric left ventricular hypertrophy.  3.Normal right ventricle size  and systolic function.  4.There is mild (1+) aortic regurgitation. Decel pressure half-time is 685 ms  at a heart rate of 70 bpm. Moderate valvular aortic stenosis.At heart rate of  70 peak velocity is 3.2 m/s with a mean gradient 20 mmHg with a dimensionless  index of 0.67.  Compared to the prior study dated 11/12/2020, the Aortic stenosis is slightly  worse, prior peak velocity was 2.3 m/s.    May 2023  Normal left ventricular size. Mild left ventricular hypertrophy. Left  ventricular systolic function is normal. Left ventricular ejection fraction is  estimated at 55 to 60%. Cannot exclude mild hypokinesis of the basilar  inferior wall.Diastolic Doppler findings (E/E' ratio and/or other parameters)  suggest left ventricular filling pressures are normal  Normal right ventricular size and systolic function.  Trace to mild tricuspid regurgitation. Estimate of RV systolic pressure is  normal at 20 mmHg plus right atrial pressure.  Mild to moderate calcific aortic stenosis. Peak velocity 2.7 m/s. Mean  gradient of 16 mmHg. Mild to moderate aortic insufficiency.  Compared to the examination dated May 2022. The peak velocity and mean  gradients are slightly less on the current examination when compared to  previous. The peak velocity on the prior study was 2.8 m/s with a mean  gradient of 20 mmHg. Left ventricular systolic function was more vigorous on  the prior examination.     Stress Test: November 2016   Lexiscan stress nuclear study is negative for inducible myocardial   ischemia or infarction.       Coronary angio: February 2015   Ant STEMI  95% thrombotic lesion in mid LAD, treated successfully with  2.75x16 Promus BYRON  0% residual, Indigo 3 flow pre and post  Moderate disease in distal Cx, and mild disease in RCA  LVEF 65%  EDP 17mmHg     Lab Results    Chemistry/lipid CBC Cardiac Enzymes/BNP/TSH/INR   Recent Labs   Lab Test 07/13/21  1519   CHOL 112   HDL 48   LDL 36   TRIG 140     Recent Labs   Lab Test  07/13/21  1519 03/26/21  0931 09/18/20  0857   LDL 36 33 36     Recent Labs   Lab Test 07/13/21  1519      POTASSIUM 4.8   CHLORIDE 104   CO2 21*   GLC 87   BUN 27*   CR 1.14   GFRESTIMATED 68   KITTY 10.1     Recent Labs   Lab Test 07/13/21  1519 03/26/21  0931 11/14/20  0534   CR 1.14 1.00 0.77     Recent Labs   Lab Test 03/26/21  0931 09/18/20  0857 12/19/19  1113   A1C 9.0* 7.2* 8.1*          Recent Labs   Lab Test 11/16/20  1125 11/16/20  0728 11/15/20  0607   WBC  --   --  5.9   HGB 8.1* 7.6* 7.6*   HCT  --   --  22.4*   MCV  --   --  91   PLT  --  190 190     Recent Labs   Lab Test 11/16/20  1125 11/16/20  0728 11/15/20  0607   HGB 8.1* 7.6* 7.6*    No results for input(s): TROPONINI in the last 99893 hours.  No results for input(s): BNP, NTBNPI, NTBNP in the last 68072 hours.  No results for input(s): TSH in the last 84272 hours.  Recent Labs   Lab Test 11/11/20  1534 11/11/20  1508   INR 1.02 0.97

## 2023-07-02 DIAGNOSIS — I10 ESSENTIAL HYPERTENSION, BENIGN: ICD-10-CM

## 2023-07-03 RX ORDER — LISINOPRIL 20 MG/1
TABLET ORAL
Qty: 180 TABLET | Refills: 3 | Status: SHIPPED | OUTPATIENT
Start: 2023-07-03 | End: 2024-07-05

## 2023-08-27 ENCOUNTER — HEALTH MAINTENANCE LETTER (OUTPATIENT)
Age: 68
End: 2023-08-27

## 2024-03-24 ENCOUNTER — HEALTH MAINTENANCE LETTER (OUTPATIENT)
Age: 69
End: 2024-03-24

## 2024-06-02 ENCOUNTER — HEALTH MAINTENANCE LETTER (OUTPATIENT)
Age: 69
End: 2024-06-02

## 2024-07-02 DIAGNOSIS — I10 ESSENTIAL HYPERTENSION, BENIGN: ICD-10-CM

## 2024-07-05 RX ORDER — LISINOPRIL 20 MG/1
20 TABLET ORAL 2 TIMES DAILY
Qty: 180 TABLET | Refills: 0 | Status: SHIPPED | OUTPATIENT
Start: 2024-07-05 | End: 2024-10-01

## 2024-09-23 ENCOUNTER — TRANSFERRED RECORDS (OUTPATIENT)
Dept: HEALTH INFORMATION MANAGEMENT | Facility: CLINIC | Age: 69
End: 2024-09-23
Payer: COMMERCIAL

## 2024-09-30 DIAGNOSIS — I10 ESSENTIAL HYPERTENSION, BENIGN: Primary | ICD-10-CM

## 2024-10-01 RX ORDER — LISINOPRIL 20 MG/1
20 TABLET ORAL 2 TIMES DAILY
Qty: 60 TABLET | Refills: 0 | Status: SHIPPED | OUTPATIENT
Start: 2024-10-01

## 2024-10-01 NOTE — TELEPHONE ENCOUNTER
Patient overdue for follow-up with Dr. Wilkins. Message sent to scheduling to arrange. [facility]

## 2024-10-20 ENCOUNTER — HEALTH MAINTENANCE LETTER (OUTPATIENT)
Age: 69
End: 2024-10-20

## 2025-05-03 ENCOUNTER — HEALTH MAINTENANCE LETTER (OUTPATIENT)
Age: 70
End: 2025-05-03

## 2025-06-14 ENCOUNTER — HEALTH MAINTENANCE LETTER (OUTPATIENT)
Age: 70
End: 2025-06-14